# Patient Record
Sex: MALE | Race: WHITE | NOT HISPANIC OR LATINO | Employment: UNEMPLOYED | URBAN - METROPOLITAN AREA
[De-identification: names, ages, dates, MRNs, and addresses within clinical notes are randomized per-mention and may not be internally consistent; named-entity substitution may affect disease eponyms.]

---

## 2023-04-12 LAB
ANION GAP IN SER/PLAS: 9 MMOL/L (ref 10–20)
CALCIUM (MG/DL) IN SER/PLAS: 9.3 MG/DL (ref 8.6–10.6)
CARBON DIOXIDE, TOTAL (MMOL/L) IN SER/PLAS: 27 MMOL/L (ref 21–32)
CHLORIDE (MMOL/L) IN SER/PLAS: 103 MMOL/L (ref 98–107)
CREATININE (MG/DL) IN SER/PLAS: 1.97 MG/DL (ref 0.5–1.3)
ESTIMATED AVERAGE GLUCOSE FOR HBA1C: 189 MG/DL
GFR MALE: 46 ML/MIN/1.73M2
GLUCOSE (MG/DL) IN SER/PLAS: 102 MG/DL (ref 74–99)
HEMOGLOBIN A1C/HEMOGLOBIN TOTAL IN BLOOD: 8.2 %
POTASSIUM (MMOL/L) IN SER/PLAS: 4.7 MMOL/L (ref 3.5–5.3)
SODIUM (MMOL/L) IN SER/PLAS: 134 MMOL/L (ref 136–145)
UREA NITROGEN (MG/DL) IN SER/PLAS: 27 MG/DL (ref 6–23)

## 2023-08-09 LAB
CHOLESTEROL (MG/DL) IN SER/PLAS: 189 MG/DL (ref 0–199)
CHOLESTEROL IN HDL (MG/DL) IN SER/PLAS: 26.1 MG/DL
CHOLESTEROL IN LDL (MG/DL) IN SER/PLAS BY DIRECT ASSAY: 85 MG/DL (ref 0–129)
CHOLESTEROL/HDL RATIO: 7.2
LDL: ABNORMAL MG/DL (ref 0–99)
THYROTROPIN (MIU/L) IN SER/PLAS BY DETECTION LIMIT <= 0.05 MIU/L: 6.12 MIU/L (ref 0.44–3.98)
THYROXINE (T4) FREE (NG/DL) IN SER/PLAS: 0.79 NG/DL (ref 0.61–1.12)
TRIGLYCERIDE (MG/DL) IN SER/PLAS: 545 MG/DL (ref 0–149)
VLDL: ABNORMAL MG/DL (ref 0–40)

## 2023-10-23 DIAGNOSIS — E78.1 PURE HYPERGLYCERIDEMIA: ICD-10-CM

## 2023-10-23 RX ORDER — FENOFIBRATE 48 MG/1
48 TABLET, FILM COATED ORAL DAILY
Qty: 30 TABLET | Refills: 0 | Status: SHIPPED | OUTPATIENT
Start: 2023-10-23

## 2023-11-22 ENCOUNTER — OFFICE VISIT (OUTPATIENT)
Dept: OPHTHALMOLOGY | Facility: CLINIC | Age: 33
End: 2023-11-22
Payer: MEDICARE

## 2023-11-22 DIAGNOSIS — E10.3541: Primary | ICD-10-CM

## 2023-11-22 DIAGNOSIS — E10.3543: ICD-10-CM

## 2023-11-22 PROCEDURE — 99213 OFFICE O/P EST LOW 20 MIN: CPT | Performed by: OPHTHALMOLOGY

## 2023-11-22 ASSESSMENT — ENCOUNTER SYMPTOMS
ENDOCRINE NEGATIVE: 0
EYES NEGATIVE: 0
RESPIRATORY NEGATIVE: 0
CONSTITUTIONAL NEGATIVE: 0
HEMATOLOGIC/LYMPHATIC NEGATIVE: 0
ALLERGIC/IMMUNOLOGIC NEGATIVE: 0
GASTROINTESTINAL NEGATIVE: 0
NEUROLOGICAL NEGATIVE: 0
CARDIOVASCULAR NEGATIVE: 0
MUSCULOSKELETAL NEGATIVE: 0
PSYCHIATRIC NEGATIVE: 0

## 2023-11-22 ASSESSMENT — SLIT LAMP EXAM - LIDS
COMMENTS: NORMAL
COMMENTS: NORMAL

## 2023-11-22 ASSESSMENT — VISUAL ACUITY
OD_SC: NLP
OS_SC: NLP
METHOD: SNELLEN - LINEAR

## 2023-11-22 ASSESSMENT — TONOMETRY
IOP_METHOD: TONOPEN
OS_IOP_MMHG: 26
OD_IOP_MMHG: UNABLE

## 2023-11-22 ASSESSMENT — CUP TO DISC RATIO
OS_RATIO: UNABLE
OD_RATIO: NO VIEW

## 2023-11-22 ASSESSMENT — EXTERNAL EXAM - LEFT EYE: OS_EXAM: NORMAL

## 2023-11-22 ASSESSMENT — EXTERNAL EXAM - RIGHT EYE: OD_EXAM: NORMAL

## 2023-11-22 NOTE — PROGRESS NOTES
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     [facility] for visual rehab;      Plan  RTC 6 months     Continue drops as below:     Atropine BID OU     Pred BID OU     Cosopt BID OU

## 2023-12-19 ENCOUNTER — OFFICE VISIT (OUTPATIENT)
Dept: PRIMARY CARE | Facility: CLINIC | Age: 33
End: 2023-12-19
Payer: MEDICARE

## 2023-12-19 VITALS
OXYGEN SATURATION: 95 % | BODY MASS INDEX: 46.49 KG/M2 | HEIGHT: 69 IN | WEIGHT: 313.9 LBS | HEART RATE: 91 BPM | TEMPERATURE: 97.9 F | DIASTOLIC BLOOD PRESSURE: 93 MMHG | SYSTOLIC BLOOD PRESSURE: 150 MMHG

## 2023-12-19 DIAGNOSIS — E10.3413: ICD-10-CM

## 2023-12-19 DIAGNOSIS — E66.01 OBESITY, MORBID (MULTI): ICD-10-CM

## 2023-12-19 DIAGNOSIS — Z23 ENCOUNTER FOR IMMUNIZATION: Primary | ICD-10-CM

## 2023-12-19 DIAGNOSIS — E03.9 HYPOTHYROIDISM, UNSPECIFIED TYPE: ICD-10-CM

## 2023-12-19 DIAGNOSIS — I10 HYPERTENSION, UNSPECIFIED TYPE: ICD-10-CM

## 2023-12-19 PROBLEM — H21.01 HYPHEMA, RIGHT EYE: Status: RESOLVED | Noted: 2023-12-19 | Resolved: 2023-12-19

## 2023-12-19 PROBLEM — E08.311 ADVANCED DIABETIC MACULOPATHY WITH RETINOPATHY AND MACULAR EDEMA ASSOCIATED WITH DIABETES MELLITUS DUE TO UNDERLYING CONDITION (MULTI): Status: ACTIVE | Noted: 2023-12-19

## 2023-12-19 PROBLEM — H20.00 ACUTE IRITIS: Status: RESOLVED | Noted: 2023-12-19 | Resolved: 2023-12-19

## 2023-12-19 PROBLEM — E78.00 HYPERCHOLESTEREMIA: Status: ACTIVE | Noted: 2023-12-19

## 2023-12-19 PROBLEM — J30.81 ALLERGY TO CATS: Status: ACTIVE | Noted: 2023-12-19

## 2023-12-19 PROBLEM — N28.9 RENAL DYSFUNCTION: Status: ACTIVE | Noted: 2023-12-19

## 2023-12-19 PROBLEM — N50.89 TESTICLE LUMP: Status: RESOLVED | Noted: 2023-12-19 | Resolved: 2023-12-19

## 2023-12-19 PROBLEM — G56.03 BILATERAL CARPAL TUNNEL SYNDROME: Status: ACTIVE | Noted: 2023-12-19

## 2023-12-19 PROBLEM — H40.53X0 NEOVASCULAR GLAUCOMA OF BOTH EYES: Status: ACTIVE | Noted: 2023-12-19

## 2023-12-19 PROBLEM — H53.8 BLURRY VISION, RIGHT EYE: Status: ACTIVE | Noted: 2023-12-19

## 2023-12-19 PROBLEM — E55.9 VITAMIN D DEFICIENCY: Status: ACTIVE | Noted: 2023-12-19

## 2023-12-19 PROBLEM — E11.9 DIABETES (MULTI): Status: ACTIVE | Noted: 2023-11-22

## 2023-12-19 PROBLEM — H40.50X0 NEOVASCULAR GLAUCOMA: Status: ACTIVE | Noted: 2023-12-19

## 2023-12-19 PROBLEM — H01.003 BLEPHARITIS OF RIGHT EYELID: Status: RESOLVED | Noted: 2023-12-19 | Resolved: 2023-12-19

## 2023-12-19 PROBLEM — E10.3519: Status: ACTIVE | Noted: 2023-12-19

## 2023-12-19 PROBLEM — H25.099 SENILE INCIPIENT CATARACT: Status: ACTIVE | Noted: 2023-12-19

## 2023-12-19 PROBLEM — H57.11 PAIN OF RIGHT EYE: Status: RESOLVED | Noted: 2023-12-19 | Resolved: 2023-12-19

## 2023-12-19 PROCEDURE — 82570 ASSAY OF URINE CREATININE: CPT

## 2023-12-19 PROCEDURE — 3077F SYST BP >= 140 MM HG: CPT | Performed by: NURSE PRACTITIONER

## 2023-12-19 PROCEDURE — 4010F ACE/ARB THERAPY RXD/TAKEN: CPT | Performed by: NURSE PRACTITIONER

## 2023-12-19 PROCEDURE — 90686 IIV4 VACC NO PRSV 0.5 ML IM: CPT | Performed by: NURSE PRACTITIONER

## 2023-12-19 PROCEDURE — 3052F HG A1C>EQUAL 8.0%<EQUAL 9.0%: CPT | Performed by: NURSE PRACTITIONER

## 2023-12-19 PROCEDURE — 3080F DIAST BP >= 90 MM HG: CPT | Performed by: NURSE PRACTITIONER

## 2023-12-19 PROCEDURE — 1036F TOBACCO NON-USER: CPT | Performed by: NURSE PRACTITIONER

## 2023-12-19 PROCEDURE — 82043 UR ALBUMIN QUANTITATIVE: CPT

## 2023-12-19 PROCEDURE — 99214 OFFICE O/P EST MOD 30 MIN: CPT | Performed by: NURSE PRACTITIONER

## 2023-12-19 PROCEDURE — 3062F POS MACROALBUMINURIA REV: CPT | Performed by: NURSE PRACTITIONER

## 2023-12-19 PROCEDURE — G0008 ADMIN INFLUENZA VIRUS VAC: HCPCS | Performed by: NURSE PRACTITIONER

## 2023-12-19 RX ORDER — FLASH GLUCOSE SENSOR
1 KIT MISCELLANEOUS
COMMUNITY
Start: 2023-08-18 | End: 2024-01-09 | Stop reason: SDUPTHER

## 2023-12-19 RX ORDER — HUMAN INSULIN 100 [IU]/ML
INJECTION, SUSPENSION SUBCUTANEOUS
COMMUNITY
End: 2024-03-26

## 2023-12-19 RX ORDER — AMLODIPINE BESYLATE 10 MG/1
10 TABLET ORAL DAILY
COMMUNITY
End: 2024-03-05 | Stop reason: SDUPTHER

## 2023-12-19 RX ORDER — ERYTHROMYCIN 5 MG/G
1 OINTMENT OPHTHALMIC NIGHTLY
COMMUNITY
Start: 2019-09-25

## 2023-12-19 RX ORDER — ATROPINE SULFATE 10 MG/ML
1 SOLUTION/ DROPS OPHTHALMIC 2 TIMES DAILY
COMMUNITY
Start: 2019-12-30 | End: 2024-03-26 | Stop reason: SDUPTHER

## 2023-12-19 RX ORDER — DORZOLAMIDE HYDROCHLORIDE AND TIMOLOL MALEATE 20; 5 MG/ML; MG/ML
1 SOLUTION/ DROPS OPHTHALMIC 2 TIMES DAILY
COMMUNITY
End: 2024-01-08

## 2023-12-19 RX ORDER — HUMAN INSULIN 100 [IU]/ML
100 INJECTION, SOLUTION SUBCUTANEOUS DAILY
COMMUNITY

## 2023-12-19 RX ORDER — CHOLECALCIFEROL (VITAMIN D3) 125 MCG
125 CAPSULE ORAL DAILY
COMMUNITY

## 2023-12-19 RX ORDER — ATORVASTATIN CALCIUM 20 MG/1
20 TABLET, FILM COATED ORAL DAILY
COMMUNITY

## 2023-12-19 RX ORDER — PREDNISOLONE ACETATE 10 MG/ML
1 SUSPENSION/ DROPS OPHTHALMIC 2 TIMES DAILY
COMMUNITY

## 2023-12-19 RX ORDER — KRILL/OM-3/DHA/EPA/PHOSPHO/AST 1000-170MG
CAPSULE ORAL
COMMUNITY

## 2023-12-19 RX ORDER — METOPROLOL SUCCINATE 100 MG/1
100 TABLET, EXTENDED RELEASE ORAL DAILY
COMMUNITY
End: 2024-05-10 | Stop reason: SDUPTHER

## 2023-12-19 RX ORDER — CETIRIZINE HYDROCHLORIDE 10 MG/1
10 TABLET ORAL DAILY
COMMUNITY

## 2023-12-19 RX ORDER — LISINOPRIL 20 MG/1
20 TABLET ORAL DAILY
COMMUNITY
End: 2024-03-05 | Stop reason: SDUPTHER

## 2023-12-19 RX ORDER — LEVOTHYROXINE SODIUM 100 UG/1
100 TABLET ORAL DAILY
COMMUNITY
Start: 2023-10-23 | End: 2024-04-23 | Stop reason: SDUPTHER

## 2023-12-19 RX ORDER — HYDROCHLOROTHIAZIDE 12.5 MG/1
12.5 TABLET ORAL DAILY
COMMUNITY

## 2023-12-19 RX ORDER — SERTRALINE HYDROCHLORIDE 50 MG/1
50 TABLET, FILM COATED ORAL DAILY
COMMUNITY
End: 2024-03-05 | Stop reason: SDUPTHER

## 2023-12-19 ASSESSMENT — PATIENT HEALTH QUESTIONNAIRE - PHQ9
1. LITTLE INTEREST OR PLEASURE IN DOING THINGS: NOT AT ALL
SUM OF ALL RESPONSES TO PHQ9 QUESTIONS 1 AND 2: 0
2. FEELING DOWN, DEPRESSED OR HOPELESS: NOT AT ALL

## 2023-12-19 NOTE — PROGRESS NOTES
Chief Complaint  Hypertension.    History Of Present Illness  Jose Luis Britton is a 33 y.o. male presents today for follow up of Hypertension and DM   Presents today w momVitor Benavides remains on amlodipine, hydrochlorothiazide, lisinoprol and metoprolol for blood pressure management.  He does not monitor blood pressures at home.   He is compliant with medications.  Blood pressure is slightly elevated today but has not been so over the last 2 visits.  He denies headaches, chest pains, shortness of breath.     Reports that his blood sugars have been mostly stable but he does experience low blood sugars 53-69 several times per week.  Low blood sugars occur in the early afternoon.  He frequently also awakes with high blood sugars 210-240.  He is following a low-carb diet.  For diabetes management he has been taking Novolin NPH 65 units in a.m. and 70 units in p.m., Novolin R 25 units before breakfast, 25 units before lunch, 30 units before dinner plus sliding scale.           Anxiety/depression  Sertraline 50 mg daily. Moods stable.      HM   Tetanus vaccine today   - Aim for 30 minutes of aerobic exercise, 5 times per week. Try to cut back on processed foods, including foods made with flour, sugar, and added salt.   -recommended biannual dental exams and cleanings.           Review of Systems  Review of Systems    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), Glaucoma secondary to other eye disorders, right eye, indeterminate stage (06/07/2019), Glaucoma secondary to other eye disorders, unspecified eye, indeterminate stage (12/28/2018), Hyphema, right eye (12/19/2023), Hypothyroidism, unspecified (11/18/2021), Ocular pain, right eye, Other specified disorders of eye and adnexa  (02/08/2019), Other visual disturbances (12/26/2018), Pure hypercholesterolemia, unspecified (01/28/2018), Testicle lump (12/19/2023), Type 1 diabetes mellitus without complications (CMS/HCC) (11/14/2022), Unspecified acute and subacute iridocyclitis (12/27/2018), Unspecified blepharitis right eye, unspecified eyelid (08/22/2018), and Unspecified retinal disorder (01/09/2020).    Surgical History  He has a past surgical history that includes Other surgical history (03/29/2022).    Family History  Family History   Problem Relation Name Age of Onset    Diverticulosis Mother      Arthritis Mother      Endometriosis Mother      Migraines Mother      Thyroid disease Mother      Hypertension Father      Cancer Maternal Grandfather      Other (cardiac disorder) Paternal Grandfather          Social History  He reports that he has never smoked. He has never used smokeless tobacco. He reports that he does not drink alcohol and does not use drugs.    Allergies  Cat dander    Medications  Current Outpatient Medications   Medication Instructions    amLODIPine (NORVASC) 10 mg, oral, Daily, as directed    atorvastatin (LIPITOR) 20 mg, oral, Daily    atropine 1 % ophthalmic solution 1 drop, Both Eyes, 2 times daily    cetirizine (ZYRTEC) 10 mg, oral, Daily    cholecalciferol (VITAMIN D-3) 125 mcg, oral, Daily    dorzolamide-timoloL (Cosopt) 22.3-6.8 mg/mL ophthalmic solution INSTILL 1 DROP IN BOTH EYES TWICE DAILY    erythromycin (Romycin) 5 mg/gram (0.5 %) ophthalmic ointment 1 Application, Both Eyes, Nightly    fenofibrate (TRICOR) 48 mg, oral, Daily    FreeStyle Deep 2 Sensor kit 1 each, subcutaneous, Every 14 days    hydroCHLOROthiazide (HYDRODIURIL) 12.5 mg, oral, Daily    krill-om-3-dha-epa-phospho-ast (krill oil) 1,671-375-03-80 mg capsule oral    levothyroxine (SYNTHROID, LEVOXYL) 100 mcg, oral, Daily    lisinopril 20 mg, oral, Daily    metoprolol succinate XL (TOPROL-XL) 100 mg, oral, Daily    NovoLIN N NPH U-100 Insulin  "100 unit/mL injection  inject 65 UNITS IN THE MORNING and 70 UNITS IN THE EVENING at bedtime    NovoLIN R Regular U100 Insulin 100 Units, subcutaneous, Daily, 25, 25, 30    prednisoLONE acetate (Pred-Forte) 1 % ophthalmic suspension 1 drop, Both Eyes, 2 times daily    sertraline (ZOLOFT) 50 mg, oral, Daily        Objective   BP (!) 150/93   Pulse 91   Temp 36.6 °C (97.9 °F)   Ht 1.753 m (5' 9\")   Wt 142 kg (313 lb 14.4 oz)   SpO2 95%   BMI 46.35 kg/m²    BMI: Estimated body mass index is 46.35 kg/m² as calculated from the following:    Height as of this encounter: 1.753 m (5' 9\").    Weight as of this encounter: 142 kg (313 lb 14.4 oz).    Physical Exam  Physical Exam    Relevant Results and Imaging  Office Visit on 12/19/2023   Component Date Value Ref Range Status    Albumin, Urine Random 12/19/2023 1,585.7  Not established mg/L Final    Creatinine, Urine Random 12/19/2023 73.3  20.0 - 370.0 mg/dL Final    Albumin/Creatine Ratio 12/19/2023 2,163.3 (H)  <30.0 ug/mg Creat Final   Orders Only on 08/09/2023   Component Date Value Ref Range Status    TSH 08/09/2023 6.12 (H)  0.44 - 3.98 mIU/L Final    Comment:  TSH testing is performed using different testing    methodology at [facility] than at other    [facility]. Direct result comparisons should    only be made within the same method.      Cholesterol 08/09/2023 189  0 - 199 mg/dL Final    Comment: .      AGE      DESIRABLE   BORDERLINE HIGH   HIGH     0-19 Y     0 - 169       170 - 199     >/= 200    20-24 Y     0 - 189       190 - 224     >/= 225         >24 Y     0 - 199       200 - 239     >/= 240   **All ranges are based on fasting samples. Specific   therapeutic targets will vary based on patient-specific   cardiac risk.  .   Pediatric guidelines reference:Pediatrics 2011, 128(S5).   Adult guidelines reference: NCEP ATPIII Guidelines,     YAZAN 2001, 258:2486-97  .   Venipuncture immediately after or during the    administration of " Metamizole may lead to falsely   low results. Testing should be performed immediately   prior to Metamizole dosing.      HDL 08/09/2023 26.1 (A)  mg/dL Final    Comment: .      AGE      VERY LOW   LOW     NORMAL    HIGH       0-19 Y       < 35   < 40     40-45     ----    20-24 Y       ----   < 40       >45     ----      >24 Y       ----   < 40     40-60      >60  .      Cholesterol/HDL Ratio 08/09/2023 7.2 (A)   Final    Comment: REF VALUES  DESIRABLE  < 3.4  HIGH RISK  > 5.0      LDL 08/09/2023 -  0 - 99 mg/dL Final    Comment: .                           NEAR      BORD      AGE      DESIRABLE  OPTIMAL    HIGH     HIGH     VERY HIGH     0-19 Y     0 - 109     ---    110-129   >/= 130     ----    20-24 Y     0 - 119     ---    120-159   >/= 160     ----      >24 Y     0 -  99   100-129  130-159   160-189     >/=190  .  THE CALCULATION OF LDL AND VLDL ARE INACCURATE  WHEN TRIGLYCERIDES ARE GREATER THAN 400 MG/DL  OR WHEN THE PATIENT IS NON-FASTING. IF LDL  MEASUREMENT IS NECESSARY CONTACT THE TESTING  LABORATORY FOR AN ALTERNATIVE LDL ASSAY.      VLDL 08/09/2023 SEE COMMENT  0 - 40 mg/dL Final      Unable to calculate VLDL.    Triglycerides 08/09/2023 545 (H)  0 - 149 mg/dL Final    Comment: .      AGE      DESIRABLE   BORDERLINE HIGH   HIGH     VERY HIGH   0 D-90 D    19 - 174         ----         ----        ----  91 D- 9 Y     0 -  74        75 -  99     >/= 100      ----    10-19 Y     0 -  89        90 - 129     >/= 130      ----    20-24 Y     0 - 114       115 - 149     >/= 150      ----         >24 Y     0 - 149       150 - 199    200- 499    >/= 500  .   Venipuncture immediately after or during the    administration of Metamizole may lead to falsely   low results. Testing should be performed immediately   prior to Metamizole dosing.      Free T4 08/09/2023 0.79  0.61 - 1.12 ng/dL Final    Comment:  Thyroxine Free testing is performed using different testing    methodology at [facility] than at  other    [facility] hospitals. Direct result comparisons should    only be made within the same method.  .   Biotin can cause falsely elevated free T4 results. Patients   taking a Biotin dose of up to 10 mg/day should refrain from   taking Biotin for 24 hours before sample collection. Patient   taking a Biotin dose of >10 mg/day should consult with their   physician or the laboratory before the blood draw.      LDL Direct 08/09/2023 85  0 - 129 mg/dL Final    Comment: Elevated levels of LDL cholesterol are recognized as a key   factor in the development of atherosclerosis and CHD. The   direct LDL cholesterol test can be used to assess   cardiovascular risk and monitor therapy as a follow up to   a lipid profile when triglycerides are significantly elevated.     Orders Only on 04/12/2023   Component Date Value Ref Range Status    Glucose 04/12/2023 102 (H)  74 - 99 mg/dL Final    Sodium 04/12/2023 134 (L)  136 - 145 mmol/L Final    Potassium 04/12/2023 4.7  3.5 - 5.3 mmol/L Final    Chloride 04/12/2023 103  98 - 107 mmol/L Final    Bicarbonate 04/12/2023 27  21 - 32 mmol/L Final    Anion Gap 04/12/2023 9 (L)  10 - 20 mmol/L Final    Urea Nitrogen 04/12/2023 27 (H)  6 - 23 mg/dL Final    Creatinine 04/12/2023 1.97 (H)  0.50 - 1.30 mg/dL Final    GFR MALE 04/12/2023 46 (A)  >90 mL/min/1.73m2 Final    Comment:  CALCULATIONS OF ESTIMATED GFR ARE PERFORMED   USING THE 2021 CKD-EPI STUDY REFIT EQUATION   WITHOUT THE RACE VARIABLE FOR THE IDMS-TRACEABLE   CREATININE METHODS.    https://jasn.asnjournals.org/content/early/2021/09/22/ASN.1315217897      Calcium 04/12/2023 9.3  8.6 - 10.6 mg/dL Final   Orders Only on 04/12/2023   Component Date Value Ref Range Status    Hemoglobin A1C 04/12/2023 8.2 (A)  % Final    Comment:      Diagnosis of Diabetes-Adults   Non-Diabetic: < or = 5.6%   Increased risk for developing diabetes: 5.7-6.4%   Diagnostic of diabetes: > or = 6.5%  .       Monitoring of Diabetes                Age (y)      Therapeutic Goal (%)   Adults:          >18           <7.0   Pediatrics:    13-18           <7.5                   7-12           <8.0                   0- 6            7.5-8.5   American Diabetes Association. Diabetes Care 33(S1), Jan 2010.      Estimated Average Glucose 04/12/2023 189  MG/DL Final     No images are attached to the encounter.        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    PLAN:Decrease AM NPH to 60u, incraese evening NPH to 75   -cont  Novolin R 25/25/30 +ssi units with meals  -Last hemoglobin A1c 4/12/2023 8.2, creatinine 1.97 (baseline uptrending year over year)-was referred to nephrology Dr. Romo- does not see regularly. Has not followed up as of yet   -currently does not follow w podiatrist - referral ordered   -Diabetic visual loss/neovascular glaucoma both eyes/cataract  -opthalmic medication: Atropine sulfate 1%, dorzolamide/timolol, erythromycin 5 mg/g ophthalmic ointment, prednisolone ophthalmic.Last eye exam: Dr. Chang         Relevant Orders    Hemoglobin A1C    Comprehensive Metabolic Panel    Referral to Podiatry    Albumin, urine, random (Completed)    Hypertension I10     Managed on amlodipine, hydrochlorothiazide, lisinopril and metoprolol   Slightly Elevated blood pressure today, normal over the past several visits.  No change in medications today.         Hypothyroidism E03.9     TSH today         Relevant Orders    Tsh With Reflex To Free T4 If Abnormal    Obesity, morbid (CMS/[facility]) E66.01     Other Visit Diagnoses         Codes    Encounter for immunization    -  Primary Z23    Relevant Orders    Flu vaccine (IIV4) age 6 months and greater, preservative free (Completed)

## 2023-12-20 LAB
CREAT UR-MCNC: 73.3 MG/DL (ref 20–370)
MICROALBUMIN UR-MCNC: 1585.7 MG/L
MICROALBUMIN/CREAT UR: 2163.3 UG/MG CREAT

## 2023-12-20 NOTE — ASSESSMENT & PLAN NOTE
Managed on amlodipine, hydrochlorothiazide, lisinopril and metoprolol   Slightly Elevated blood pressure today, normal over the past several visits.  No change in medications today.

## 2023-12-21 PROBLEM — F32.A ANXIETY AND DEPRESSION: Status: ACTIVE | Noted: 2023-12-21

## 2023-12-21 PROBLEM — E78.2 MIXED HYPERLIPIDEMIA: Status: ACTIVE | Noted: 2023-12-19

## 2023-12-21 PROBLEM — F41.9 ANXIETY AND DEPRESSION: Status: ACTIVE | Noted: 2023-12-21

## 2023-12-21 PROBLEM — E66.01 OBESITY, MORBID (MULTI): Status: ACTIVE | Noted: 2023-12-21

## 2023-12-22 NOTE — ASSESSMENT & PLAN NOTE
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    PLAN:Decrease AM NPH to 60u, incraese evening NPH to 75   -cont  Novolin R 25/25/30 +ssi units with meals  -Last hemoglobin A1c 4/12/2023 8.2, creatinine 1.97 (baseline uptrending year over year)-was referred to nephrology Dr. Romo- does not see regularly. Has not followed up as of yet   -currently does not follow w podiatrist - referral ordered   -Diabetic visual loss/neovascular glaucoma both eyes/cataract  -opthalmic medication: Atropine sulfate 1%, dorzolamide/timolol, erythromycin 5 mg/g ophthalmic ointment, prednisolone ophthalmic.Last eye exam: Dr. Chang

## 2023-12-22 NOTE — ASSESSMENT & PLAN NOTE
Triglycerides elevated 8/9/23  -Since last visit due to elevated triglycerides have started patient on fenofibrate.  -repeat labs at next visit

## 2024-01-05 DIAGNOSIS — H40.51X4 GLAUCOMA SECONDARY TO OTHER EYE DISORDERS, RIGHT EYE, INDETERMINATE STAGE: ICD-10-CM

## 2024-01-08 RX ORDER — DORZOLAMIDE HYDROCHLORIDE AND TIMOLOL MALEATE 20; 5 MG/ML; MG/ML
SOLUTION/ DROPS OPHTHALMIC
Qty: 60 ML | Refills: 0 | Status: SHIPPED | OUTPATIENT
Start: 2024-01-08

## 2024-01-09 ENCOUNTER — TELEPHONE (OUTPATIENT)
Dept: PRIMARY CARE | Facility: CLINIC | Age: 34
End: 2024-01-09

## 2024-01-09 ENCOUNTER — APPOINTMENT (OUTPATIENT)
Dept: PODIATRY | Facility: CLINIC | Age: 34
End: 2024-01-09
Payer: MEDICARE

## 2024-01-09 DIAGNOSIS — E10.3593: Primary | ICD-10-CM

## 2024-01-09 DIAGNOSIS — E08.311 ADVANCED DIABETIC MACULOPATHY WITH RETINOPATHY AND MACULAR EDEMA ASSOCIATED WITH DIABETES MELLITUS DUE TO UNDERLYING CONDITION (MULTI): ICD-10-CM

## 2024-01-09 DIAGNOSIS — E10.3519: Primary | ICD-10-CM

## 2024-01-09 RX ORDER — FLASH GLUCOSE SENSOR
1 KIT MISCELLANEOUS
Qty: 6 EACH | Refills: 3 | Status: SHIPPED | OUTPATIENT
Start: 2024-01-09 | End: 2025-01-08

## 2024-01-09 NOTE — TELEPHONE ENCOUNTER
requested call back. Would like to review labs and recommend to repeat BMP as Creatinine is elevated. A1c also elevated (8.1)-insulin was adjusted during last visit.   Kennedy needs to follow up w nephrologist (kidney doctor)

## 2024-01-29 DIAGNOSIS — J30.81 ALLERGIC RHINITIS DUE TO ANIMAL (CAT) (DOG) HAIR AND DANDER: ICD-10-CM

## 2024-01-29 RX ORDER — MONTELUKAST SODIUM 10 MG/1
10 TABLET ORAL DAILY
Qty: 90 TABLET | Refills: 1 | Status: SHIPPED | OUTPATIENT
Start: 2024-01-29 | End: 2024-03-05 | Stop reason: SDUPTHER

## 2024-03-04 ENCOUNTER — TELEPHONE (OUTPATIENT)
Dept: PRIMARY CARE | Facility: CLINIC | Age: 34
End: 2024-03-04
Payer: MEDICARE

## 2024-03-04 NOTE — TELEPHONE ENCOUNTER
Dad left message for refill of     Amlodipine   Montelukast   Lisinopril  Sertraline    To [facility]

## 2024-03-05 DIAGNOSIS — J30.81 ALLERGIC RHINITIS DUE TO ANIMAL (CAT) (DOG) HAIR AND DANDER: ICD-10-CM

## 2024-03-05 DIAGNOSIS — J30.81 ALLERGY TO CATS: ICD-10-CM

## 2024-03-05 DIAGNOSIS — E78.2 MIXED HYPERLIPIDEMIA: Primary | ICD-10-CM

## 2024-03-05 DIAGNOSIS — F41.9 ANXIETY AND DEPRESSION: ICD-10-CM

## 2024-03-05 DIAGNOSIS — F32.A ANXIETY AND DEPRESSION: ICD-10-CM

## 2024-03-05 DIAGNOSIS — I10 HYPERTENSION, UNSPECIFIED TYPE: ICD-10-CM

## 2024-03-05 RX ORDER — SERTRALINE HYDROCHLORIDE 50 MG/1
50 TABLET, FILM COATED ORAL DAILY
Qty: 90 TABLET | Refills: 3 | Status: SHIPPED | OUTPATIENT
Start: 2024-03-05 | End: 2025-03-05

## 2024-03-05 RX ORDER — AMLODIPINE BESYLATE 10 MG/1
10 TABLET ORAL DAILY
Qty: 90 TABLET | Refills: 3 | Status: SHIPPED | OUTPATIENT
Start: 2024-03-05 | End: 2025-03-05

## 2024-03-05 RX ORDER — LISINOPRIL 20 MG/1
20 TABLET ORAL DAILY
Qty: 90 TABLET | Refills: 3 | Status: SHIPPED | OUTPATIENT
Start: 2024-03-05 | End: 2025-03-05

## 2024-03-05 RX ORDER — MONTELUKAST SODIUM 10 MG/1
10 TABLET ORAL DAILY
Qty: 90 TABLET | Refills: 1 | Status: SHIPPED | OUTPATIENT
Start: 2024-03-05

## 2024-03-12 ENCOUNTER — APPOINTMENT (OUTPATIENT)
Dept: PODIATRY | Facility: CLINIC | Age: 34
End: 2024-03-12
Payer: MEDICARE

## 2024-03-26 DIAGNOSIS — E10.9 TYPE 1 DIABETES MELLITUS WITHOUT COMPLICATIONS (MULTI): ICD-10-CM

## 2024-03-26 DIAGNOSIS — E10.3519: Primary | ICD-10-CM

## 2024-03-26 RX ORDER — ATROPINE SULFATE 10 MG/ML
1 SOLUTION/ DROPS OPHTHALMIC 2 TIMES DAILY
Qty: 10 ML | Refills: 11 | Status: SHIPPED | OUTPATIENT
Start: 2024-03-26 | End: 2024-04-22 | Stop reason: SDUPTHER

## 2024-03-26 RX ORDER — HUMAN INSULIN 100 [IU]/ML
INJECTION, SUSPENSION SUBCUTANEOUS
Qty: 120 ML | Refills: 0 | Status: SHIPPED | OUTPATIENT
Start: 2024-03-26

## 2024-03-26 NOTE — TELEPHONE ENCOUNTER
Medication refill received from [facility]    Atropine 1% Eye Drops refill sent Erx with 11 refills    No further action is required at this time    Marcos Joseph

## 2024-04-15 ENCOUNTER — APPOINTMENT (OUTPATIENT)
Dept: PRIMARY CARE | Facility: CLINIC | Age: 34
End: 2024-04-15
Payer: MEDICARE

## 2024-04-19 ENCOUNTER — APPOINTMENT (OUTPATIENT)
Dept: PRIMARY CARE | Facility: CLINIC | Age: 34
End: 2024-04-19
Payer: MEDICARE

## 2024-04-22 ENCOUNTER — OFFICE VISIT (OUTPATIENT)
Dept: PRIMARY CARE | Facility: CLINIC | Age: 34
End: 2024-04-22
Payer: MEDICARE

## 2024-04-22 VITALS
SYSTOLIC BLOOD PRESSURE: 138 MMHG | WEIGHT: 304.6 LBS | BODY MASS INDEX: 42.64 KG/M2 | TEMPERATURE: 97.4 F | DIASTOLIC BLOOD PRESSURE: 99 MMHG | OXYGEN SATURATION: 98 % | HEART RATE: 84 BPM | HEIGHT: 71 IN

## 2024-04-22 DIAGNOSIS — E66.01 OBESITY, MORBID (MULTI): ICD-10-CM

## 2024-04-22 DIAGNOSIS — E10.3519: ICD-10-CM

## 2024-04-22 DIAGNOSIS — E08.311 ADVANCED DIABETIC MACULOPATHY WITH RETINOPATHY AND MACULAR EDEMA ASSOCIATED WITH DIABETES MELLITUS DUE TO UNDERLYING CONDITION (MULTI): Primary | ICD-10-CM

## 2024-04-22 DIAGNOSIS — E10.3413: ICD-10-CM

## 2024-04-22 LAB — HEMOGLOBIN A1C/HEMOGLOBIN TOTAL IN BLOOD EXTERNAL: 7.3 %

## 2024-04-22 PROCEDURE — 99214 OFFICE O/P EST MOD 30 MIN: CPT | Performed by: NURSE PRACTITIONER

## 2024-04-22 PROCEDURE — 3075F SYST BP GE 130 - 139MM HG: CPT | Performed by: NURSE PRACTITIONER

## 2024-04-22 PROCEDURE — 4010F ACE/ARB THERAPY RXD/TAKEN: CPT | Performed by: NURSE PRACTITIONER

## 2024-04-22 PROCEDURE — 3080F DIAST BP >= 90 MM HG: CPT | Performed by: NURSE PRACTITIONER

## 2024-04-22 RX ORDER — ATROPINE SULFATE 10 MG/ML
1 SOLUTION/ DROPS OPHTHALMIC 2 TIMES DAILY
Qty: 10 ML | Refills: 11 | Status: SHIPPED | OUTPATIENT
Start: 2024-04-22

## 2024-04-22 ASSESSMENT — ENCOUNTER SYMPTOMS
FATIGUE: 0
SWEATS: 0
CONFUSION: 0
BLACKOUTS: 0
HUNGER: 0
SPEECH DIFFICULTY: 0
WEIGHT LOSS: 0
POLYDIPSIA: 0
NERVOUS/ANXIOUS: 0
BLURRED VISION: 1
VISUAL CHANGE: 0
DIZZINESS: 0
TREMORS: 0
HEADACHES: 0
SEIZURES: 0
POLYPHAGIA: 0
WEAKNESS: 0

## 2024-04-22 ASSESSMENT — PATIENT HEALTH QUESTIONNAIRE - PHQ9
SUM OF ALL RESPONSES TO PHQ9 QUESTIONS 1 AND 2: 0
2. FEELING DOWN, DEPRESSED OR HOPELESS: NOT AT ALL
1. LITTLE INTEREST OR PLEASURE IN DOING THINGS: NOT AT ALL

## 2024-04-22 NOTE — PROGRESS NOTES
Chief Complaint  Follow-up.    History Of Present Illness  Jose Luis Britton is a 33 y.o. male presents today for Follow-up.  Diabetes  He has type 1 diabetes mellitus. MedicAlert identification noted. The initial diagnosis of diabetes was made 27 years ago. Pertinent negatives for hypoglycemia include no confusion, dizziness, headaches, hunger, mood changes, nervousness/anxiousness, pallor, seizures, sleepiness, speech difficulty, sweats or tremors. Associated symptoms include blurred vision. Pertinent negatives for diabetes include no chest pain, no fatigue, no foot paresthesias, no foot ulcerations, no polydipsia, no polyphagia, no polyuria, no visual change, no weakness and no weight loss. Hypoglycemia complications include nocturnal hypoglycemia and required assistance. Pertinent negatives for hypoglycemia complications include no blackouts, no hospitalization and no required glucagon injection. Symptoms are stable. Diabetic complications include heart disease, nephropathy and retinopathy. Pertinent negatives for diabetic complications include no CVA, impotence, peripheral neuropathy or PVD. Risk factors for coronary artery disease include dyslipidemia, family history, hypertension, obesity and sedentary lifestyle. Current diabetic treatment includes insulin injections and oral agent (triple therapy). He is compliant with treatment all of the time. He is currently taking insulin pre-breakfast, pre-lunch, pre-dinner and at bedtime. Insulin injections are given by grandparent and parent. Rotation sites for injection include the abdominal wall, arms and thighs. His weight is fluctuating minimally. He is following a diabetic, generally unhealthy, high fat/cholesterol and high salt diet. Meal planning includes carbohydrate counting. He has not had a previous visit with a dietitian. He rarely participates in exercise. He monitors blood glucose at home 5+ x per day. He monitors urine at home <1 x per month. Blood  glucose monitoring compliance is excellent. His home blood glucose trend is fluctuating minimally. His breakfast blood glucose is taken between 9-10 am. His breakfast blood glucose range is generally 110-130 mg/dl. His lunch blood glucose is taken between 12-1 pm. His lunch blood glucose range is generally 110-130 mg/dl. His dinner blood glucose is taken between 5-6 pm. His dinner blood glucose range is generally 110-130 mg/dl. His overall blood glucose range is 140-180 mg/dl. He does not see a podiatrist.Eye exam is current.     Does experience low blood sugars 1-2 times per week.  Has not had to use glucagon.  Has been using a snack which helps.       Denies falls and injuries     Review of Systems  Review of Systems   Constitutional:  Negative for activity change, appetite change, chills, fatigue, fever and weight loss.   Eyes:  Positive for blurred vision.   Respiratory:  Negative for chest tightness and shortness of breath.    Cardiovascular:  Negative for chest pain.   Endocrine: Negative for polydipsia, polyphagia and polyuria.   Genitourinary:  Negative for impotence.   Skin:  Negative for pallor.   Neurological:  Negative for dizziness, tremors, seizures, speech difficulty, weakness and headaches.   Psychiatric/Behavioral:  Negative for confusion. The patient is not nervous/anxious.        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), Glaucoma secondary to other eye disorders, right eye, indeterminate stage (06/07/2019), Glaucoma secondary to other eye disorders, unspecified eye, indeterminate stage (12/28/2018), Hyphema, right eye (12/19/2023), Hypothyroidism, unspecified (11/18/2021), Ocular pain, right eye, Other specified disorders of eye and adnexa (02/08/2019), Other visual  disturbances (12/26/2018), Pure hypercholesterolemia, unspecified (01/28/2018), Testicle lump (12/19/2023), Type 1 diabetes mellitus without complications (Multi) (11/14/2022), Unspecified acute and subacute iridocyclitis (12/27/2018), Unspecified blepharitis right eye, unspecified eyelid (08/22/2018), and Unspecified retinal disorder (01/09/2020).    Surgical History  He has a past surgical history that includes Other surgical history (03/29/2022).    Family History  Family History   Problem Relation Name Age of Onset    Diverticulosis Mother      Arthritis Mother      Endometriosis Mother      Migraines Mother      Thyroid disease Mother      Hypertension Father      Cancer Maternal Grandfather      Other (cardiac disorder) Paternal Grandfather          Social History  He reports that he has never smoked. He has never used smokeless tobacco. He reports that he does not drink alcohol and does not use drugs.    DEPRESSION SCREEN  Over the past 2 weeks, how often have you been bothered by any of the following problems?  Little interest or pleasure in doing things: Not at all  Feeling down, depressed, or hopeless: Not at all      Allergies  Cat dander    Medications  Current Outpatient Medications   Medication Instructions    amLODIPine (NORVASC) 10 mg, oral, Daily, as directed    atorvastatin (LIPITOR) 20 mg, oral, Daily    atropine 1 % ophthalmic solution 1 drop, Both Eyes, 2 times daily    cetirizine (ZYRTEC) 10 mg, oral, Daily    cholecalciferol (VITAMIN D-3) 125 mcg, oral, Daily    dorzolamide-timoloL (Cosopt) 22.3-6.8 mg/mL ophthalmic solution INSTILL 1 DROP IN BOTH EYES TWICE DAILY    erythromycin (Romycin) 5 mg/gram (0.5 %) ophthalmic ointment 1 Application, Both Eyes, Nightly    fenofibrate (TRICOR) 48 mg, oral, Daily    FreeStyle Deep 2 Sensor kit 1 each, subcutaneous, Every 14 days    hydroCHLOROthiazide (MICROZIDE) 12.5 mg, oral, Daily    insulin NPH, Isophane, (NovoLIN N NPH U-100 Insulin) 100 unit/mL  "injection inject 65 UNITS IN THE MORNING and 70 UNITS IN THE EVENING at bedtime    krill-om-3-dha-epa-phospho-ast (krill oil) 1,559-835-59-80 mg capsule oral    levothyroxine (SYNTHROID, LEVOXYL) 112 mcg, oral, Daily    lisinopril 20 mg, oral, Daily    metoprolol succinate XL (TOPROL-XL) 100 mg, oral, Daily    montelukast (SINGULAIR) 10 mg, oral, Daily    NovoLIN R Regular U100 Insulin 100 Units, subcutaneous, Daily, 25, 25, 30    prednisoLONE acetate (Pred-Forte) 1 % ophthalmic suspension 1 drop, Both Eyes, 2 times daily    sertraline (ZOLOFT) 50 mg, oral, Daily        Objective   BP (!) 138/99 (BP Location: Left arm, Patient Position: Sitting, BP Cuff Size: Adult)   Pulse 84   Temp 36.3 °C (97.4 °F) (Temporal)   Ht 1.803 m (5' 11\")   Wt 138 kg (304 lb 9.6 oz)   SpO2 98%   BMI 42.48 kg/m²    BMI: Estimated body mass index is 42.48 kg/m² as calculated from the following:    Height as of this encounter: 1.803 m (5' 11\").    Weight as of this encounter: 138 kg (304 lb 9.6 oz).     Physical Exam  Physical Exam  Vitals and nursing note reviewed.   Constitutional:       Appearance: Normal appearance. He is obese.   HENT:      Head: Normocephalic and atraumatic.      Nose: Nose normal.      Mouth/Throat:      Mouth: Mucous membranes are moist.      Pharynx: Oropharynx is clear.   Eyes:      General: Lids are normal.      Comments: Blind    Cardiovascular:      Rate and Rhythm: Normal rate and regular rhythm.      Pulses: Normal pulses.      Heart sounds: Normal heart sounds.   Pulmonary:      Effort: Pulmonary effort is normal.      Breath sounds: Normal breath sounds.   Abdominal:      General: Bowel sounds are normal.      Palpations: Abdomen is soft.      Tenderness: There is no abdominal tenderness.   Musculoskeletal:         General: Normal range of motion.      Cervical back: Neck supple.   Skin:     General: Skin is warm and dry.   Neurological:      General: No focal deficit present.      Mental Status: He " is alert and oriented to person, place, and time. Mental status is at baseline.   Psychiatric:         Mood and Affect: Mood normal.         Behavior: Behavior normal.         Thought Content: Thought content normal.         Judgment: Judgment normal.         Relevant Results and Imaging  Office Visit on 12/19/2023   Component Date Value Ref Range Status    Albumin, Urine Random 12/19/2023 1,585.7  Not established mg/L Final    Creatinine, Urine Random 12/19/2023 73.3  20.0 - 370.0 mg/dL Final    Albumin/Creatine Ratio 12/19/2023 2,163.3 (H)  <30.0 ug/mg Creat Final   Orders Only on 08/09/2023   Component Date Value Ref Range Status    TSH 08/09/2023 6.12 (H)  0.44 - 3.98 mIU/L Final    Comment:  TSH testing is performed using different testing    methodology at [facility] than at other    [facility]. Direct result comparisons should    only be made within the same method.      Cholesterol 08/09/2023 189  0 - 199 mg/dL Final    Comment: .      AGE      DESIRABLE   BORDERLINE HIGH   HIGH     0-19 Y     0 - 169       170 - 199     >/= 200    20-24 Y     0 - 189       190 - 224     >/= 225         >24 Y     0 - 199       200 - 239     >/= 240   **All ranges are based on fasting samples. Specific   therapeutic targets will vary based on patient-specific   cardiac risk.  .   Pediatric guidelines reference:Pediatrics 2011, 128(S5).   Adult guidelines reference: NCEP ATPIII Guidelines,     YAZAN 2001, 258:2486-97  .   Venipuncture immediately after or during the    administration of Metamizole may lead to falsely   low results. Testing should be performed immediately   prior to Metamizole dosing.      HDL 08/09/2023 26.1 (A)  mg/dL Final    Comment: .      AGE      VERY LOW   LOW     NORMAL    HIGH       0-19 Y       < 35   < 40     40-45     ----    20-24 Y       ----   < 40       >45     ----      >24 Y       ----   < 40     40-60      >60  .      Cholesterol/HDL Ratio 08/09/2023 7.2 (A)   Final     Comment: REF VALUES  DESIRABLE  < 3.4  HIGH RISK  > 5.0      LDL 08/09/2023 -  0 - 99 mg/dL Final    Comment: .                           NEAR      BORD      AGE      DESIRABLE  OPTIMAL    HIGH     HIGH     VERY HIGH     0-19 Y     0 - 109     ---    110-129   >/= 130     ----    20-24 Y     0 - 119     ---    120-159   >/= 160     ----      >24 Y     0 -  99   100-129  130-159   160-189     >/=190  .  THE CALCULATION OF LDL AND VLDL ARE INACCURATE  WHEN TRIGLYCERIDES ARE GREATER THAN 400 MG/DL  OR WHEN THE PATIENT IS NON-FASTING. IF LDL  MEASUREMENT IS NECESSARY CONTACT THE TESTING  LABORATORY FOR AN ALTERNATIVE LDL ASSAY.      VLDL 08/09/2023 SEE COMMENT  0 - 40 mg/dL Final      Unable to calculate VLDL.    Triglycerides 08/09/2023 545 (H)  0 - 149 mg/dL Final    Comment: .      AGE      DESIRABLE   BORDERLINE HIGH   HIGH     VERY HIGH   0 D-90 D    19 - 174         ----         ----        ----  91 D- 9 Y     0 -  74        75 -  99     >/= 100      ----    10-19 Y     0 -  89        90 - 129     >/= 130      ----    20-24 Y     0 - 114       115 - 149     >/= 150      ----         >24 Y     0 - 149       150 - 199    200- 499    >/= 500  .   Venipuncture immediately after or during the    administration of Metamizole may lead to falsely   low results. Testing should be performed immediately   prior to Metamizole dosing.      Free T4 08/09/2023 0.79  0.61 - 1.12 ng/dL Final    Comment:  Thyroxine Free testing is performed using different testing    methodology at [facility] [place] than at other    [facility]. Direct result comparisons should    only be made within the same method.  .   Biotin can cause falsely elevated free T4 results. Patients   taking a Biotin dose of up to 10 mg/day should refrain from   taking Biotin for 24 hours before sample collection. Patient   taking a Biotin dose of >10 mg/day should consult with their   physician or the laboratory before the blood draw.      LDL Direct  08/09/2023 85  0 - 129 mg/dL Final    Comment: Elevated levels of LDL cholesterol are recognized as a key   factor in the development of atherosclerosis and CHD. The   direct LDL cholesterol test can be used to assess   cardiovascular risk and monitor therapy as a follow up to   a lipid profile when triglycerides are significantly elevated.       No images are attached to the encounter.        Assessment and Plan  Assessment/Plan   Problem List Items Addressed This Visit             ICD-10-CM    Diabetes (Multi) E11.9     Type 1 diabetes-with ophthalmic complications and renal dysfunction  AM -200, hypoglycemia  1-2 x/week frequent w  BS low- 53-69.    -cont AM NPH 60u, cont evening NPH  75   -cont  Novolin R 25/25/30 +ssi units with meals  -Last hemoglobin A1c12/21/2023 8.2, creatinine increased - referred to nephrology Dr. Romo- does not see regularly. Has not followed up as of yet   -currentlt does not follow w podiatrist - referral ordered   -Diabetic visual loss/neovascular glaucoma both eyes/cataract  -opthalmic medication: Atropine sulfate 1%, dorzolamide/timolol, erythromycin 5 mg/g ophthalmic ointment, prednisolone ophthalmic.Last eye exam: Dr. 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

## 2024-04-23 DIAGNOSIS — E03.9 HYPOTHYROIDISM, UNSPECIFIED TYPE: Primary | ICD-10-CM

## 2024-04-23 DIAGNOSIS — E10.22 TYPE 1 DIABETES MELLITUS WITH STAGE 3B CHRONIC KIDNEY DISEASE (MULTI): ICD-10-CM

## 2024-04-23 DIAGNOSIS — N18.32 TYPE 1 DIABETES MELLITUS WITH STAGE 3B CHRONIC KIDNEY DISEASE (MULTI): ICD-10-CM

## 2024-04-23 RX ORDER — LEVOTHYROXINE SODIUM 112 UG/1
112 TABLET ORAL DAILY
Qty: 90 TABLET | Refills: 0 | Status: SHIPPED | OUTPATIENT
Start: 2024-04-23 | End: 2024-07-22

## 2024-04-27 ASSESSMENT — ENCOUNTER SYMPTOMS
DIZZINESS: 0
ACTIVITY CHANGE: 0
BLACKOUTS: 0
CHEST TIGHTNESS: 0
TREMORS: 0
NERVOUS/ANXIOUS: 0
POLYPHAGIA: 0
SPEECH DIFFICULTY: 0
HUNGER: 0
SWEATS: 0
FATIGUE: 0
HEADACHES: 0
SEIZURES: 0
APPETITE CHANGE: 0
FEVER: 0
WEAKNESS: 0
CHILLS: 0
WEIGHT LOSS: 0
POLYDIPSIA: 0
CONFUSION: 0
SHORTNESS OF BREATH: 0
BLURRED VISION: 1
VISUAL CHANGE: 0

## 2024-04-28 NOTE — ASSESSMENT & PLAN NOTE
Type 1 diabetes-with ophthalmic complications and renal dysfunction  AM -200, hypoglycemia  1-2 x/week frequent w  BS low- 53-69.    -cont AM NPH 60u, cont evening NPH  75   -cont  Novolin R 25/25/30 +ssi units with meals  -Last hemoglobin A1c12/21/2023 8.2, creatinine increased - referred to nephrology Dr. Romo- does not see regularly. Has not followed up as of yet   -currentlt does not follow w podiatrist - referral ordered   -Diabetic visual loss/neovascular glaucoma both eyes/cataract  -opthalmic medication: Atropine sulfate 1%, dorzolamide/timolol, erythromycin 5 mg/g ophthalmic ointment, prednisolone ophthalmic.Last eye exam: Dr. Chang-

## 2024-04-30 ENCOUNTER — TELEPHONE (OUTPATIENT)
Dept: PRIMARY CARE | Facility: CLINIC | Age: 34
End: 2024-04-30
Payer: MEDICARE

## 2024-04-30 DIAGNOSIS — E03.9 HYPOTHYROIDISM, UNSPECIFIED TYPE: Primary | ICD-10-CM

## 2024-04-30 NOTE — TELEPHONE ENCOUNTER
Reviewed lab recommendations w patients mom who states she will pass on message to Jose Luis and his father.

## 2024-05-01 ENCOUNTER — TELEPHONE (OUTPATIENT)
Dept: RHEUMATOLOGY | Facility: CLINIC | Age: 34
End: 2024-05-01
Payer: MEDICARE

## 2024-05-09 ENCOUNTER — TELEPHONE (OUTPATIENT)
Dept: RHEUMATOLOGY | Facility: CLINIC | Age: 34
End: 2024-05-09
Payer: MEDICARE

## 2024-05-10 DIAGNOSIS — I10 HYPERTENSION, UNSPECIFIED TYPE: Primary | ICD-10-CM

## 2024-05-10 RX ORDER — METOPROLOL SUCCINATE 100 MG/1
100 TABLET, EXTENDED RELEASE ORAL DAILY
Qty: 90 TABLET | Refills: 3 | Status: SHIPPED | OUTPATIENT
Start: 2024-05-10 | End: 2025-05-10

## 2024-05-15 ENCOUNTER — LAB (OUTPATIENT)
Dept: LAB | Facility: LAB | Age: 34
End: 2024-05-15
Payer: MEDICARE

## 2024-05-15 ENCOUNTER — OFFICE VISIT (OUTPATIENT)
Dept: PRIMARY CARE | Facility: CLINIC | Age: 34
End: 2024-05-15
Payer: MEDICARE

## 2024-05-15 VITALS
BODY MASS INDEX: 44.27 KG/M2 | HEIGHT: 70 IN | HEART RATE: 91 BPM | SYSTOLIC BLOOD PRESSURE: 161 MMHG | TEMPERATURE: 98.1 F | DIASTOLIC BLOOD PRESSURE: 103 MMHG | WEIGHT: 309.2 LBS | OXYGEN SATURATION: 95 %

## 2024-05-15 DIAGNOSIS — N28.9 RENAL DYSFUNCTION: ICD-10-CM

## 2024-05-15 DIAGNOSIS — E10.3499: ICD-10-CM

## 2024-05-15 DIAGNOSIS — I10 HYPERTENSION, UNSPECIFIED TYPE: Primary | ICD-10-CM

## 2024-05-15 DIAGNOSIS — E08.311 ADVANCED DIABETIC MACULOPATHY WITH RETINOPATHY AND MACULAR EDEMA ASSOCIATED WITH DIABETES MELLITUS DUE TO UNDERLYING CONDITION (MULTI): ICD-10-CM

## 2024-05-15 LAB
ANION GAP SERPL CALC-SCNC: 13 MMOL/L (ref 10–20)
BUN SERPL-MCNC: 26 MG/DL (ref 6–23)
CALCIUM SERPL-MCNC: 8.6 MG/DL (ref 8.6–10.6)
CHLORIDE SERPL-SCNC: 102 MMOL/L (ref 98–107)
CO2 SERPL-SCNC: 23 MMOL/L (ref 21–32)
CREAT SERPL-MCNC: 2.54 MG/DL (ref 0.5–1.3)
EGFRCR SERPLBLD CKD-EPI 2021: 34 ML/MIN/1.73M*2
GLUCOSE SERPL-MCNC: 215 MG/DL (ref 74–99)
POTASSIUM SERPL-SCNC: 4.4 MMOL/L (ref 3.5–5.3)
SODIUM SERPL-SCNC: 134 MMOL/L (ref 136–145)

## 2024-05-15 PROCEDURE — 3080F DIAST BP >= 90 MM HG: CPT | Performed by: NURSE PRACTITIONER

## 2024-05-15 PROCEDURE — 4010F ACE/ARB THERAPY RXD/TAKEN: CPT | Performed by: NURSE PRACTITIONER

## 2024-05-15 PROCEDURE — 1036F TOBACCO NON-USER: CPT | Performed by: NURSE PRACTITIONER

## 2024-05-15 PROCEDURE — 80048 BASIC METABOLIC PNL TOTAL CA: CPT

## 2024-05-15 PROCEDURE — 99214 OFFICE O/P EST MOD 30 MIN: CPT | Performed by: NURSE PRACTITIONER

## 2024-05-15 PROCEDURE — 3077F SYST BP >= 140 MM HG: CPT | Performed by: NURSE PRACTITIONER

## 2024-05-15 PROCEDURE — 36415 COLL VENOUS BLD VENIPUNCTURE: CPT

## 2024-05-15 RX ORDER — HYDRALAZINE HYDROCHLORIDE 25 MG/1
25 TABLET, FILM COATED ORAL 2 TIMES DAILY
Qty: 180 TABLET | Refills: 1 | Status: SHIPPED | OUTPATIENT
Start: 2024-05-15 | End: 2024-11-11

## 2024-05-15 RX ORDER — SYRINGE-NEEDLE,INSULIN,0.5 ML 27GX1/2"
SYRINGE, EMPTY DISPOSABLE MISCELLANEOUS
Qty: 400 EACH | Refills: 3 | Status: SHIPPED | OUTPATIENT
Start: 2024-05-15 | End: 2025-05-15

## 2024-05-15 ASSESSMENT — ENCOUNTER SYMPTOMS
CHILLS: 0
POLYDIPSIA: 0
TREMORS: 0
ACTIVITY CHANGE: 0
CHEST TIGHTNESS: 0
FEVER: 0
NERVOUS/ANXIOUS: 0
SEIZURES: 0
SPEECH DIFFICULTY: 0
CONFUSION: 0
WEAKNESS: 0
POLYPHAGIA: 0
SHORTNESS OF BREATH: 0
HEADACHES: 1
DIZZINESS: 0
APPETITE CHANGE: 0
FATIGUE: 0

## 2024-05-15 ASSESSMENT — PATIENT HEALTH QUESTIONNAIRE - PHQ9
1. LITTLE INTEREST OR PLEASURE IN DOING THINGS: NOT AT ALL
2. FEELING DOWN, DEPRESSED OR HOPELESS: NOT AT ALL
SUM OF ALL RESPONSES TO PHQ9 QUESTIONS 1 AND 2: 0

## 2024-05-15 NOTE — ASSESSMENT & PLAN NOTE
Managed on amlodipine, hydrochlorothiazide, and metoprolol   -on lisinopril until 4/30/24- Dc'd due to DEEPAK and hyperkalemia.    -5/15/24-repeat basic metabolic panel  -Start hydralazine 25 mg p.o. twice daily for management of blood pressure, patient to monitor blood pressures at home and bring in record at follow-up in 2 weeks

## 2024-05-15 NOTE — PROGRESS NOTES
"Chief Complaint  Follow-up (C/o some headaches, and \"feeling\" his HR in extremities/).    History Of Present Illness  Jose Luis Britton is a 33 y.o. male presents today for follow up of Follow-up (C/o some headaches, and \"feeling\" his HR in extremities/).  Due to elevated creatinine and potassium level patient's lisinopril has been discontinued and he presents today for follow-up of hypertension.  He was instructed to make follow-up appointment with nephrologist-has not set up yet as the offered appointments were too far away from patient's home.  Will continue to look for nephrologist.    Blood pressure is elevated today-likely related to discontinuation of lisinopril.  Reports occasional headaches, increase sensation of pulses.    Requesting refill on diabetic supply: Insulin syringes.  Administers 4 to 5 injections/day  Review of Systems  Review of Systems   Constitutional:  Negative for activity change, appetite change, chills, fatigue and fever.   Respiratory:  Negative for chest tightness and shortness of breath.    Cardiovascular:  Negative for chest pain.   Endocrine: Negative for polydipsia, polyphagia and polyuria.   Skin:  Negative for pallor.   Neurological:  Positive for headaches. Negative for dizziness, tremors, seizures, speech difficulty and weakness.   Psychiatric/Behavioral:  Negative for confusion. The patient is not nervous/anxious.        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), Glaucoma secondary to other eye disorders, right eye, indeterminate stage (06/07/2019), Glaucoma secondary to other eye disorders, unspecified eye, indeterminate stage (12/28/2018), Hyphema, right eye (12/19/2023), Hypothyroidism, unspecified (11/18/2021), Ocular pain, right eye, Other " specified disorders of eye and adnexa (02/08/2019), Other visual disturbances (12/26/2018), Pure hypercholesterolemia, unspecified (01/28/2018), Testicle lump (12/19/2023), Type 1 diabetes mellitus without complications (Multi) (11/14/2022), Unspecified acute and subacute iridocyclitis (12/27/2018), Unspecified blepharitis right eye, unspecified eyelid (08/22/2018), and Unspecified retinal disorder (01/09/2020).    Surgical History  He has a past surgical history that includes Other surgical history (03/29/2022).    Family History  Family History   Problem Relation Name Age of Onset    Diverticulosis Mother      Arthritis Mother      Endometriosis Mother      Migraines Mother      Thyroid disease Mother      Hypertension Father      Cancer Maternal Grandfather      Other (cardiac disorder) Paternal Grandfather          Social History  He reports that he has never smoked. He has never used smokeless tobacco. He reports that he does not drink alcohol and does not use drugs.    DEPRESSION SCREEN  Over the past 2 weeks, how often have you been bothered by any of the following problems?  Little interest or pleasure in doing things: Not at all  Feeling down, depressed, or hopeless: Not at all      Allergies  Cat dander    Medications  Current Outpatient Medications   Medication Instructions    amLODIPine (NORVASC) 10 mg, oral, Daily, as directed    atorvastatin (LIPITOR) 20 mg, oral, Daily    atropine 1 % ophthalmic solution 1 drop, Both Eyes, 2 times daily    cetirizine (ZYRTEC) 10 mg, oral, Daily    cholecalciferol (VITAMIN D-3) 125 mcg, oral, Daily    dorzolamide-timoloL (Cosopt) 22.3-6.8 mg/mL ophthalmic solution INSTILL 1 DROP IN BOTH EYES TWICE DAILY    erythromycin (Romycin) 5 mg/gram (0.5 %) ophthalmic ointment 1 Application, Both Eyes, Nightly    fenofibrate (TRICOR) 48 mg, oral, Daily    FreeStyle Deep 2 Sensor kit 1 each, subcutaneous, Every 14 days    hydrALAZINE (APRESOLINE) 25 mg, oral, 2 times daily     "hydroCHLOROthiazide (MICROZIDE) 12.5 mg, oral, Daily    insulin NPH, Isophane, (NovoLIN N NPH U-100 Insulin) 100 unit/mL injection inject 65 UNITS IN THE MORNING and 70 UNITS IN THE EVENING at bedtime    insulin syringe-needle U-100 1 mL 30 gauge x 1/2\" syringe Use to inject 4 times daily as directed.    krill-om-3-dha-epa-phospho-ast (krill oil) 1,089-058-73-80 mg capsule oral    levothyroxine (SYNTHROID, LEVOXYL) 112 mcg, oral, Daily    lisinopril 20 mg, oral, Daily    metoprolol succinate XL (TOPROL-XL) 100 mg, oral, Daily    montelukast (SINGULAIR) 10 mg, oral, Daily    NovoLIN R Regular U100 Insulin 100 Units, subcutaneous, Daily, 25, 25, 30    prednisoLONE acetate (Pred-Forte) 1 % ophthalmic suspension 1 drop, Both Eyes, 2 times daily    sertraline (ZOLOFT) 50 mg, oral, Daily        Objective   BP (!) 161/103 (BP Location: Left arm, Patient Position: Sitting, BP Cuff Size: Adult)   Pulse 91   Temp 36.7 °C (98.1 °F) (Temporal)   Ht 1.778 m (5' 10\")   Wt 140 kg (309 lb 3.2 oz)   SpO2 95%   BMI 44.37 kg/m²    BMI: Estimated body mass index is 44.37 kg/m² as calculated from the following:    Height as of this encounter: 1.778 m (5' 10\").    Weight as of this encounter: 140 kg (309 lb 3.2 oz).    Physical Exam  Physical Exam  Vitals and nursing note reviewed.   Constitutional:       Appearance: Normal appearance. He is obese.   HENT:      Head: Normocephalic and atraumatic.      Nose: Nose normal.      Mouth/Throat:      Mouth: Mucous membranes are moist.      Pharynx: Oropharynx is clear.   Eyes:      General: Lids are normal.      Comments: Blind    Cardiovascular:      Rate and Rhythm: Normal rate and regular rhythm.      Heart sounds: Normal heart sounds.   Pulmonary:      Effort: Pulmonary effort is normal.      Breath sounds: Normal breath sounds.   Abdominal:      Palpations: Abdomen is soft.   Musculoskeletal:         General: Normal range of motion.      Cervical back: Neck supple.   Skin:     " "General: Skin is warm and dry.   Neurological:      General: No focal deficit present.      Mental Status: He is alert and oriented to person, place, and time. Mental status is at baseline.   Psychiatric:         Mood and Affect: Mood normal.         Behavior: Behavior normal.         Thought Content: Thought content normal.         Judgment: Judgment normal.         Relevant Results and Imaging    No images are attached to the encounter.        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    -5/15/24-repeat basic metabolic panel  -Start hydralazine 25 mg p.o. twice daily for management of blood pressure, patient to monitor blood pressures at home and bring in record at follow-up in 2 weeks            Relevant Medications    hydrALAZINE (Apresoline) 25 mg tablet    Renal dysfunction N28.9     Chronic, creatinine elevated patient with diabetes  Referred to nephrology  Creatinine baseline 1.5 to 2.7--steadily uptrending  Urine albumin very elevated  -5/15/2024 BMP repeated after discontinuation of lisinopril.  Strongly advised to set up appointment with nephrologist.         Relevant Orders    Basic metabolic panel     ADDENDUM:   BS monitoring w CGM.  Cont on insulin 4-5x/day (see med list)   Patient is adherent plan  to and benefiting from CGM equipment.      "

## 2024-05-15 NOTE — ASSESSMENT & PLAN NOTE
Chronic, creatinine elevated patient with diabetes  Referred to nephrology  Creatinine baseline 1.5 to 2.7--steadily uptrending  Urine albumin very elevated  -5/15/2024 BMP repeated after discontinuation of lisinopril.  Strongly advised to set up appointment with nephrologist.

## 2024-05-22 ENCOUNTER — APPOINTMENT (OUTPATIENT)
Dept: OPHTHALMOLOGY | Facility: CLINIC | Age: 34
End: 2024-05-22
Payer: MEDICARE

## 2024-06-05 ENCOUNTER — APPOINTMENT (OUTPATIENT)
Dept: PRIMARY CARE | Facility: CLINIC | Age: 34
End: 2024-06-05
Payer: MEDICARE

## 2024-06-13 DIAGNOSIS — J30.81 ALLERGIC RHINITIS DUE TO ANIMAL (CAT) (DOG) HAIR AND DANDER: ICD-10-CM

## 2024-06-13 DIAGNOSIS — J30.81 ALLERGY TO CATS: ICD-10-CM

## 2024-06-13 DIAGNOSIS — I10 ESSENTIAL (PRIMARY) HYPERTENSION: ICD-10-CM

## 2024-06-14 ENCOUNTER — TELEPHONE (OUTPATIENT)
Dept: PRIMARY CARE | Facility: CLINIC | Age: 34
End: 2024-06-14
Payer: MEDICARE

## 2024-06-14 RX ORDER — HYDROCHLOROTHIAZIDE 12.5 MG/1
12.5 TABLET ORAL DAILY
Qty: 90 TABLET | Refills: 0 | Status: SHIPPED | OUTPATIENT
Start: 2024-06-14

## 2024-06-14 RX ORDER — MONTELUKAST SODIUM 10 MG/1
10 TABLET ORAL DAILY
Qty: 90 TABLET | Refills: 0 | Status: SHIPPED | OUTPATIENT
Start: 2024-06-14

## 2024-06-14 NOTE — TELEPHONE ENCOUNTER
----- Message from INESSA Arevalo sent at 6/14/2024  9:34 AM EDT -----  Can you please call patient and remind him that he needs to come in for blood pressure follow up

## 2024-06-19 ENCOUNTER — APPOINTMENT (OUTPATIENT)
Dept: PRIMARY CARE | Facility: CLINIC | Age: 34
End: 2024-06-19
Payer: MEDICARE

## 2024-06-24 ENCOUNTER — APPOINTMENT (OUTPATIENT)
Dept: PRIMARY CARE | Facility: CLINIC | Age: 34
End: 2024-06-24
Payer: MEDICARE

## 2024-06-24 VITALS
SYSTOLIC BLOOD PRESSURE: 141 MMHG | HEIGHT: 68 IN | DIASTOLIC BLOOD PRESSURE: 90 MMHG | BODY MASS INDEX: 46.15 KG/M2 | OXYGEN SATURATION: 96 % | WEIGHT: 304.5 LBS | HEART RATE: 87 BPM | TEMPERATURE: 97.2 F

## 2024-06-24 DIAGNOSIS — I10 HYPERTENSION, UNSPECIFIED TYPE: Primary | ICD-10-CM

## 2024-06-24 DIAGNOSIS — N28.9 RENAL DYSFUNCTION: ICD-10-CM

## 2024-06-24 PROCEDURE — 3080F DIAST BP >= 90 MM HG: CPT | Performed by: NURSE PRACTITIONER

## 2024-06-24 PROCEDURE — 3077F SYST BP >= 140 MM HG: CPT | Performed by: NURSE PRACTITIONER

## 2024-06-24 PROCEDURE — 4010F ACE/ARB THERAPY RXD/TAKEN: CPT | Performed by: NURSE PRACTITIONER

## 2024-06-24 PROCEDURE — 99213 OFFICE O/P EST LOW 20 MIN: CPT | Performed by: NURSE PRACTITIONER

## 2024-06-24 RX ORDER — HYDRALAZINE HYDROCHLORIDE 50 MG/1
50 TABLET, FILM COATED ORAL 2 TIMES DAILY
Qty: 180 TABLET | Refills: 3 | Status: SHIPPED | OUTPATIENT
Start: 2024-06-24 | End: 2025-06-24

## 2024-06-24 NOTE — PROGRESS NOTES
Chief Complaint  Follow-up (Hypertension).    History Of Present Illness  Jose Luis Britton is a 33 y.o. male presents today for follow up of Follow-up (Hypertension).  Presents today with father.    Has not set up w nephrologist as of yet.   Was instructed to follow-up due to DEEPAK on CKD. Does stay well-hydrated drinks water, tea, diet soda.      BP elevated at home but stable in office today.  Tolerating hydralazine well.        Review of Systems  Review of Systems   Constitutional:  Negative for activity change, appetite change, chills and fatigue.   Respiratory:  Negative for cough and shortness of breath.    Cardiovascular:  Negative for chest pain.       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), Glaucoma secondary to other eye disorders, right eye, indeterminate stage (06/07/2019), Glaucoma secondary to other eye disorders, unspecified eye, indeterminate stage (12/28/2018), Hyphema, right eye (12/19/2023), Hypothyroidism, unspecified (11/18/2021), Ocular pain, right eye, Other specified disorders of eye and adnexa (02/08/2019), Other visual disturbances (12/26/2018), Pure hypercholesterolemia, unspecified (01/28/2018), Testicle lump (12/19/2023), Type 1 diabetes mellitus without complications (Multi) (11/14/2022), Unspecified acute and subacute iridocyclitis (12/27/2018), Unspecified blepharitis right eye, unspecified eyelid (08/22/2018), and Unspecified retinal disorder (01/09/2020).    Surgical History  He has a past surgical history that includes Other surgical history (03/29/2022).    Family History  Family History   Problem Relation Name Age of Onset    Diverticulosis Mother      Arthritis Mother      Endometriosis Mother      Migraines Mother      Thyroid disease Mother       "Hypertension Father      Cancer Maternal Grandfather      Other (cardiac disorder) Paternal Grandfather          Social History  He reports that he has never smoked. He has never used smokeless tobacco. He reports that he does not drink alcohol and does not use drugs.    DEPRESSION SCREEN  Over the past 2 weeks, how often have you been bothered by any of the following problems?  Little interest or pleasure in doing things: Not at all  Feeling down, depressed, or hopeless: Not at all      Allergies  Cat dander    Medications  Current Outpatient Medications   Medication Instructions    amLODIPine (NORVASC) 10 mg, oral, Daily, as directed    atorvastatin (LIPITOR) 20 mg, oral, Daily    atropine 1 % ophthalmic solution 1 drop, Both Eyes, 2 times daily    cetirizine (ZYRTEC) 10 mg, oral, Daily    cholecalciferol (VITAMIN D-3) 125 mcg, oral, Daily    dorzolamide-timoloL (Cosopt) 22.3-6.8 mg/mL ophthalmic solution INSTILL 1 DROP IN BOTH EYES TWICE DAILY    erythromycin (Romycin) 5 mg/gram (0.5 %) ophthalmic ointment 1 Application, Both Eyes, Nightly    fenofibrate (TRICOR) 48 mg, oral, Daily    FreeStyle Deep 2 Sensor kit 1 each, subcutaneous, Every 14 days    hydrALAZINE (APRESOLINE) 50 mg, oral, 2 times daily    hydroCHLOROthiazide (MICROZIDE) 12.5 mg, oral, Daily    insulin NPH, Isophane, (NovoLIN N NPH U-100 Insulin) 100 unit/mL injection inject 65 UNITS IN THE MORNING and 70 UNITS IN THE EVENING at bedtime    insulin regular (NovoLIN R Regular U100 Insulin) 100 unit/mL injection INJECT 100 UNIT Daily for daily meal insulin needs.    insulin syringe-needle U-100 1 mL 30 gauge x 1/2\" syringe Use to inject 4 times daily as directed.    krill-om-3-dha-epa-phospho-ast (krill oil) 1,[id number] mg capsule oral    levothyroxine (SYNTHROID, LEVOXYL) 112 mcg, oral, Daily    lisinopril 20 mg, oral, Daily    metoprolol succinate XL (TOPROL-XL) 100 mg, oral, Daily    montelukast (SINGULAIR) 10 mg, oral, Daily    prednisoLONE " "acetate (Pred-Forte) 1 % ophthalmic suspension 1 drop, Both Eyes, 2 times daily    sertraline (ZOLOFT) 50 mg, oral, Daily        Objective   /90 (BP Location: Left arm, Patient Position: Sitting, BP Cuff Size: Adult)   Pulse 87   Temp 36.2 °C (97.2 °F) (Temporal)   Ht 1.727 m (5' 8\")   Wt 138 kg (304 lb 8 oz)   SpO2 96%   BMI 46.30 kg/m²    BMI: Estimated body mass index is 46.3 kg/m² as calculated from the following:    Height as of this encounter: 1.727 m (5' 8\").    Weight as of this encounter: 138 kg (304 lb 8 oz).    Physical Exam  Physical Exam  Vitals and nursing note reviewed.   Constitutional:       Appearance: Normal appearance. He is obese.   HENT:      Head: Normocephalic and atraumatic.      Nose: Nose normal.      Mouth/Throat:      Mouth: Mucous membranes are moist.      Pharynx: Oropharynx is clear.   Eyes:      General: Lids are normal.      Comments: Blind    Cardiovascular:      Rate and Rhythm: Normal rate and regular rhythm.      Heart sounds: Normal heart sounds.   Pulmonary:      Effort: Pulmonary effort is normal.      Breath sounds: Normal breath sounds.   Abdominal:      Palpations: Abdomen is soft.   Musculoskeletal:         General: Normal range of motion.      Cervical back: Neck supple.   Skin:     General: Skin is warm and dry.   Neurological:      General: No focal deficit present.      Mental Status: He is alert and oriented to person, place, and time. Mental status is at baseline.   Psychiatric:         Mood and Affect: Mood normal.         Behavior: Behavior normal.         Thought Content: Thought content normal.         Judgment: Judgment normal.         Relevant Results and Imaging  Lab on 05/15/2024   Component Date Value Ref Range Status    Glucose 05/15/2024 215 (H)  74 - 99 mg/dL Final    Sodium 05/15/2024 134 (L)  136 - 145 mmol/L Final    Potassium 05/15/2024 4.4  3.5 - 5.3 mmol/L Final    Chloride 05/15/2024 102  98 - 107 mmol/L Final    Bicarbonate 05/15/2024 " 23  21 - 32 mmol/L Final    Anion Gap 05/15/2024 13  10 - 20 mmol/L Final    Urea Nitrogen 05/15/2024 26 (H)  6 - 23 mg/dL Final    Creatinine 05/15/2024 2.54 (H)  0.50 - 1.30 mg/dL Final    eGFR 05/15/2024 34 (L)  >60 mL/min/1.73m*2 Final    Calculations of estimated GFR are performed using the 2021 CKD-EPI Study Refit equation without the race variable for the IDMS-Traceable creatinine methods.  https://jasn.asnjournals.org/content/early/2021/09/22/ASN.9828081720    Calcium 05/15/2024 8.6  8.6 - 10.6 mg/dL Final   Office Visit on 12/19/2023   Component Date Value Ref Range Status    Albumin, Urine Random 12/19/2023 1,585.7  Not established mg/L Final    Creatinine, Urine Random 12/19/2023 73.3  20.0 - 370.0 mg/dL Final    Albumin/Creatinine Ratio 12/19/2023 2,163.3 (H)  <30.0 ug/mg Creat Final   Orders Only on 08/09/2023   Component Date Value Ref Range Status    TSH 08/09/2023 6.12 (H)  0.44 - 3.98 mIU/L Final    Comment:  TSH testing is performed using different testing    methodology at [facility] than at other    [facility]. Direct result comparisons should    only be made within the same method.      Cholesterol 08/09/2023 189  0 - 199 mg/dL Final    Comment: .      AGE      DESIRABLE   BORDERLINE HIGH   HIGH     0-19 Y     0 - 169       170 - 199     >/= 200    20-24 Y     0 - 189       190 - 224     >/= 225         >24 Y     0 - 199       200 - 239     >/= 240   **All ranges are based on fasting samples. Specific   therapeutic targets will vary based on patient-specific   cardiac risk.  .   Pediatric guidelines reference:Pediatrics 2011, 128(S5).   Adult guidelines reference: NCEP ATPIII Guidelines,     YAZAN 2001, 258:2486-97  .   Venipuncture immediately after or during the    administration of Metamizole may lead to falsely   low results. Testing should be performed immediately   prior to Metamizole dosing.      HDL 08/09/2023 26.1 (A)  mg/dL Final    Comment: .      AGE      VERY LOW    LOW     NORMAL    HIGH       0-19 Y       < 35   < 40     40-45     ----    20-24 Y       ----   < 40       >45     ----      >24 Y       ----   < 40     40-60      >60  .      Cholesterol/HDL Ratio 08/09/2023 7.2 (A)   Final    Comment: REF VALUES  DESIRABLE  < 3.4  HIGH RISK  > 5.0      LDL 08/09/2023 -  0 - 99 mg/dL Final    Comment: .                           NEAR      BORD      AGE      DESIRABLE  OPTIMAL    HIGH     HIGH     VERY HIGH     0-19 Y     0 - 109     ---    110-129   >/= 130     ----    20-24 Y     0 - 119     ---    120-159   >/= 160     ----      >24 Y     0 -  99   100-129  130-159   160-189     >/=190  .  THE CALCULATION OF LDL AND VLDL ARE INACCURATE  WHEN TRIGLYCERIDES ARE GREATER THAN 400 MG/DL  OR WHEN THE PATIENT IS NON-FASTING. IF LDL  MEASUREMENT IS NECESSARY CONTACT THE TESTING  LABORATORY FOR AN ALTERNATIVE LDL ASSAY.      VLDL 08/09/2023 SEE COMMENT  0 - 40 mg/dL Final      Unable to calculate VLDL.    Triglycerides 08/09/2023 545 (H)  0 - 149 mg/dL Final    Comment: .      AGE      DESIRABLE   BORDERLINE HIGH   HIGH     VERY HIGH   0 D-90 D    19 - 174         ----         ----        ----  91 D- 9 Y     0 -  74        75 -  99     >/= 100      ----    10-19 Y     0 -  89        90 - 129     >/= 130      ----    20-24 Y     0 - 114       115 - 149     >/= 150      ----         >24 Y     0 - 149       150 - 199    200- 499    >/= 500  .   Venipuncture immediately after or during the    administration of Metamizole may lead to falsely   low results. Testing should be performed immediately   prior to Metamizole dosing.      Free T4 08/09/2023 0.79  0.61 - 1.12 ng/dL Final    Comment:  Thyroxine Free testing is performed using different testing    methodology at [facility] than at other    [facility]. Direct result comparisons should    only be made within the same method.  .   Biotin can cause falsely elevated free T4 results. Patients   taking a Biotin dose of up  to 10 mg/day should refrain from   taking Biotin for 24 hours before sample collection. Patient   taking a Biotin dose of >10 mg/day should consult with their   physician or the laboratory before the blood draw.      LDL Direct 08/09/2023 85  0 - 129 mg/dL Final    Comment: Elevated levels of LDL cholesterol are recognized as a key   factor in the development of atherosclerosis and CHD. The   direct LDL cholesterol test can be used to assess   cardiovascular risk and monitor therapy as a follow up to   a lipid profile when triglycerides are significantly elevated.       No images are attached to the encounter.        Assessment and Plan  Problem List Items Addressed This Visit             ICD-10-CM    Hypertension - Primary I10     Managed on amlodipine, hydrochlorothiazide, and metoprolol   -on lisinopril until 4/30/24- Dc'd due to DEEPAK and hyperkalemia.    -5/15/24-repeat basic metabolic panel-hyperkalemia resolved.   Creatinine stable/improving (2.6 improved to 2.54)  -due to hypertension started hydralazine 25 mg p.o. twice daily for management of blood pressure, -6/24/2024: Hydralazine increased to 50 mg twice a day  - Continue to monitor blood pressures at home  Follow-up in 1 month               Relevant Medications    hydrALAZINE (Apresoline) 50 mg tablet    Other Relevant Orders    Basic metabolic panel    Renal dysfunction N28.9     Chronic, creatinine elevated patient with diabetes  Referred to nephrology-patient still has not set up appointment reminded to do so  Creatinine baseline 1.5 to 2.7--now stabilized at 2.6 after discontinuation of lisinopril  Urine albumin very elevated         Relevant Orders    Basic metabolic panel

## 2024-06-25 ASSESSMENT — ENCOUNTER SYMPTOMS
COUGH: 0
APPETITE CHANGE: 0
CHILLS: 0
FATIGUE: 0
SHORTNESS OF BREATH: 0
ACTIVITY CHANGE: 0

## 2024-06-25 NOTE — ASSESSMENT & PLAN NOTE
Managed on amlodipine, hydrochlorothiazide, and metoprolol   -on lisinopril until 4/30/24- Dc'd due to DEEPAK and hyperkalemia.    -5/15/24-repeat basic metabolic panel-hyperkalemia resolved.   Creatinine stable/improving (2.6 improved to 2.54)  -due to hypertension started hydralazine 25 mg p.o. twice daily for management of blood pressure, -6/24/2024: Hydralazine increased to 50 mg twice a day  - Continue to monitor blood pressures at home  Follow-up in 1 month

## 2024-06-26 NOTE — ASSESSMENT & PLAN NOTE
Chronic, creatinine elevated patient with diabetes  Referred to nephrology-patient still has not set up appointment reminded to do so  Creatinine baseline 1.5 to 2.7--now stabilized at 2.6 after discontinuation of lisinopril  Urine albumin very elevated

## 2024-07-04 DIAGNOSIS — E10.9 TYPE 1 DIABETES MELLITUS WITHOUT COMPLICATIONS (MULTI): ICD-10-CM

## 2024-07-04 RX ORDER — HUMAN INSULIN 100 [IU]/ML
INJECTION, SUSPENSION SUBCUTANEOUS
Qty: 120 ML | Refills: 3 | Status: SHIPPED | OUTPATIENT
Start: 2024-07-04

## 2024-07-04 RX ORDER — HUMAN INSULIN 100 [IU]/ML
INJECTION, SOLUTION SUBCUTANEOUS
Qty: 350 ML | Refills: 3 | Status: SHIPPED | OUTPATIENT
Start: 2024-07-04

## 2024-07-10 ENCOUNTER — APPOINTMENT (OUTPATIENT)
Dept: OPHTHALMOLOGY | Facility: CLINIC | Age: 34
End: 2024-07-10
Payer: MEDICARE

## 2024-07-24 ENCOUNTER — APPOINTMENT (OUTPATIENT)
Dept: PRIMARY CARE | Facility: CLINIC | Age: 34
End: 2024-07-24
Payer: MEDICARE

## 2024-07-25 DIAGNOSIS — E03.9 HYPOTHYROIDISM, UNSPECIFIED TYPE: ICD-10-CM

## 2024-07-27 RX ORDER — LEVOTHYROXINE SODIUM 112 UG/1
112 TABLET ORAL DAILY
Qty: 30 TABLET | Refills: 0 | Status: SHIPPED | OUTPATIENT
Start: 2024-07-27

## 2024-07-29 ENCOUNTER — APPOINTMENT (OUTPATIENT)
Dept: PRIMARY CARE | Facility: CLINIC | Age: 34
End: 2024-07-29
Payer: MEDICARE

## 2024-08-06 ENCOUNTER — APPOINTMENT (OUTPATIENT)
Dept: PRIMARY CARE | Facility: CLINIC | Age: 34
End: 2024-08-06
Payer: MEDICARE

## 2024-08-15 ENCOUNTER — TELEPHONE (OUTPATIENT)
Dept: RHEUMATOLOGY | Facility: CLINIC | Age: 34
End: 2024-08-15
Payer: COMMERCIAL

## 2024-08-15 DIAGNOSIS — E10.9 TYPE 1 DIABETES MELLITUS WITHOUT COMPLICATIONS (MULTI): ICD-10-CM

## 2024-08-16 RX ORDER — ATORVASTATIN CALCIUM 20 MG/1
20 TABLET, FILM COATED ORAL DAILY
Qty: 90 TABLET | Refills: 0 | Status: SHIPPED | OUTPATIENT
Start: 2024-08-16

## 2024-08-21 ENCOUNTER — APPOINTMENT (OUTPATIENT)
Dept: PRIMARY CARE | Facility: CLINIC | Age: 34
End: 2024-08-21
Payer: MEDICARE

## 2024-08-26 ENCOUNTER — TELEPHONE (OUTPATIENT)
Dept: RHEUMATOLOGY | Facility: CLINIC | Age: 34
End: 2024-08-26
Payer: COMMERCIAL

## 2024-08-26 NOTE — TELEPHONE ENCOUNTER
----- Message from Ruby Chase sent at 8/9/2024  2:00 PM EDT -----    ----- Message -----  From: Lilibeth Weeks CMA  Sent: 8/9/2024   1:52 PM EDT  To: INESSA Arevalo    Which patient, no name attached  ----- Message -----  From: INESSA Arevalo  Sent: 8/9/2024  11:39 AM EDT  To: Lilibeth Weeks CMA    Please advise patient that he needs TSH (thyroid blood work done).   I have sent in a 30 day supply of med but he needs blood work before we can send more

## 2024-08-28 ENCOUNTER — LAB (OUTPATIENT)
Dept: LAB | Facility: LAB | Age: 34
End: 2024-08-28
Payer: COMMERCIAL

## 2024-08-28 DIAGNOSIS — E03.9 HYPOTHYROIDISM, UNSPECIFIED TYPE: ICD-10-CM

## 2024-08-28 DIAGNOSIS — N28.9 RENAL DYSFUNCTION: ICD-10-CM

## 2024-08-28 DIAGNOSIS — I10 HYPERTENSION, UNSPECIFIED TYPE: ICD-10-CM

## 2024-08-28 LAB
ANION GAP SERPL CALC-SCNC: 15 MMOL/L (ref 10–20)
BUN SERPL-MCNC: 23 MG/DL (ref 6–23)
CALCIUM SERPL-MCNC: 8.8 MG/DL (ref 8.6–10.6)
CHLORIDE SERPL-SCNC: 100 MMOL/L (ref 98–107)
CO2 SERPL-SCNC: 26 MMOL/L (ref 21–32)
CREAT SERPL-MCNC: 2.92 MG/DL (ref 0.5–1.3)
EGFRCR SERPLBLD CKD-EPI 2021: 28 ML/MIN/1.73M*2
GLUCOSE SERPL-MCNC: 130 MG/DL (ref 74–99)
POTASSIUM SERPL-SCNC: 4.5 MMOL/L (ref 3.5–5.3)
SODIUM SERPL-SCNC: 136 MMOL/L (ref 136–145)

## 2024-08-28 PROCEDURE — 84439 ASSAY OF FREE THYROXINE: CPT

## 2024-08-28 PROCEDURE — 80048 BASIC METABOLIC PNL TOTAL CA: CPT

## 2024-08-28 PROCEDURE — 84443 ASSAY THYROID STIM HORMONE: CPT

## 2024-08-29 DIAGNOSIS — E03.9 HYPOTHYROIDISM, UNSPECIFIED TYPE: ICD-10-CM

## 2024-08-29 DIAGNOSIS — H40.51X4 GLAUCOMA SECONDARY TO OTHER EYE DISORDERS, RIGHT EYE, INDETERMINATE STAGE: ICD-10-CM

## 2024-08-29 RX ORDER — DORZOLAMIDE HYDROCHLORIDE AND TIMOLOL MALEATE 20; 5 MG/ML; MG/ML
SOLUTION/ DROPS OPHTHALMIC
Qty: 50 ML | Refills: 0 | OUTPATIENT
Start: 2024-08-29

## 2024-08-30 DIAGNOSIS — E03.9 HYPOTHYROIDISM, UNSPECIFIED TYPE: Primary | ICD-10-CM

## 2024-08-30 LAB
T4 FREE SERPL-MCNC: 1.36 NG/DL (ref 0.78–1.48)
TSH SERPL-ACNC: 4.31 MIU/L (ref 0.44–3.98)

## 2024-09-03 RX ORDER — LEVOTHYROXINE SODIUM 112 UG/1
TABLET ORAL
Qty: 30 TABLET | Refills: 0 | OUTPATIENT
Start: 2024-09-03

## 2024-09-03 RX ORDER — LEVOTHYROXINE SODIUM 125 UG/1
125 TABLET ORAL DAILY
Qty: 90 TABLET | Refills: 0 | Status: SHIPPED | OUTPATIENT
Start: 2024-09-03 | End: 2024-12-02

## 2024-09-07 DIAGNOSIS — E10.3519: ICD-10-CM

## 2024-09-09 RX ORDER — PREDNISOLONE ACETATE 10 MG/ML
SUSPENSION/ DROPS OPHTHALMIC
Qty: 15 ML | Refills: 2 | OUTPATIENT
Start: 2024-09-09

## 2024-09-25 DIAGNOSIS — H40.51X4 GLAUCOMA SECONDARY TO OTHER EYE DISORDERS, RIGHT EYE, INDETERMINATE STAGE: ICD-10-CM

## 2024-09-25 RX ORDER — DORZOLAMIDE HYDROCHLORIDE AND TIMOLOL MALEATE 20; 5 MG/ML; MG/ML
SOLUTION/ DROPS OPHTHALMIC
Qty: 50 ML | Refills: 0 | Status: SHIPPED | OUTPATIENT
Start: 2024-09-25

## 2024-09-26 DIAGNOSIS — E78.1 PURE HYPERGLYCERIDEMIA: ICD-10-CM

## 2024-09-26 RX ORDER — FENOFIBRATE 48 MG/1
48 TABLET, FILM COATED ORAL DAILY
Qty: 90 TABLET | Refills: 0 | Status: SHIPPED | OUTPATIENT
Start: 2024-09-26

## 2024-10-28 ENCOUNTER — TELEPHONE (OUTPATIENT)
Dept: PRIMARY CARE | Facility: CLINIC | Age: 34
End: 2024-10-28
Payer: COMMERCIAL

## 2024-10-28 DIAGNOSIS — H40.53X1 NEOVASCULAR GLAUCOMA OF BOTH EYES, MILD STAGE: Primary | ICD-10-CM

## 2024-10-29 RX ORDER — PREDNISOLONE ACETATE 10 MG/ML
1 SUSPENSION/ DROPS OPHTHALMIC 2 TIMES DAILY
Qty: 5 ML | Refills: 1 | Status: SHIPPED | OUTPATIENT
Start: 2024-10-29 | End: 2024-11-28

## 2024-11-01 ENCOUNTER — APPOINTMENT (OUTPATIENT)
Dept: PRIMARY CARE | Facility: CLINIC | Age: 34
End: 2024-11-01
Payer: COMMERCIAL

## 2024-11-04 ENCOUNTER — TELEPHONE (OUTPATIENT)
Dept: RHEUMATOLOGY | Facility: CLINIC | Age: 34
End: 2024-11-04
Payer: COMMERCIAL

## 2024-11-04 DIAGNOSIS — I10 ESSENTIAL (PRIMARY) HYPERTENSION: ICD-10-CM

## 2024-11-04 DIAGNOSIS — J30.81 ALLERGY TO CATS: ICD-10-CM

## 2024-11-04 DIAGNOSIS — E10.9 TYPE 1 DIABETES MELLITUS WITHOUT COMPLICATIONS: ICD-10-CM

## 2024-11-04 DIAGNOSIS — J30.81 ALLERGIC RHINITIS DUE TO ANIMAL (CAT) (DOG) HAIR AND DANDER: ICD-10-CM

## 2024-11-04 RX ORDER — ATORVASTATIN CALCIUM 20 MG/1
20 TABLET, FILM COATED ORAL DAILY
Qty: 90 TABLET | Refills: 0 | Status: SHIPPED | OUTPATIENT
Start: 2024-11-04

## 2024-11-04 RX ORDER — HYDROCHLOROTHIAZIDE 12.5 MG/1
12.5 TABLET ORAL DAILY
Qty: 90 TABLET | Refills: 0 | Status: SHIPPED | OUTPATIENT
Start: 2024-11-04

## 2024-11-04 RX ORDER — MONTELUKAST SODIUM 10 MG/1
10 TABLET ORAL DAILY
Qty: 90 TABLET | Refills: 0 | Status: SHIPPED | OUTPATIENT
Start: 2024-11-04

## 2024-11-04 NOTE — TELEPHONE ENCOUNTER
Received a fax from [facility] in [place] for a refill of atorvastatin (Lipitor) 20 mg tablet, montelukast (Singulair) 10 mg tablet, and hydroCHLOROthiazide (Microzide) 12.5 mg tablet.

## 2024-11-11 ENCOUNTER — APPOINTMENT (OUTPATIENT)
Dept: PRIMARY CARE | Facility: CLINIC | Age: 34
End: 2024-11-11
Payer: MEDICARE

## 2024-11-11 VITALS
HEART RATE: 82 BPM | HEIGHT: 68 IN | SYSTOLIC BLOOD PRESSURE: 138 MMHG | BODY MASS INDEX: 46.32 KG/M2 | OXYGEN SATURATION: 95 % | WEIGHT: 305.6 LBS | TEMPERATURE: 97.5 F | DIASTOLIC BLOOD PRESSURE: 84 MMHG

## 2024-11-11 DIAGNOSIS — N18.31 STAGE 3A CHRONIC KIDNEY DISEASE (MULTI): ICD-10-CM

## 2024-11-11 DIAGNOSIS — E11.39 TYPE 2 DIABETES MELLITUS WITH OTHER OPHTHALMIC COMPLICATION, WITH LONG-TERM CURRENT USE OF INSULIN: ICD-10-CM

## 2024-11-11 DIAGNOSIS — Z23 ENCOUNTER FOR IMMUNIZATION: ICD-10-CM

## 2024-11-11 DIAGNOSIS — Z79.4 TYPE 2 DIABETES MELLITUS WITH OTHER OPHTHALMIC COMPLICATION, WITH LONG-TERM CURRENT USE OF INSULIN: ICD-10-CM

## 2024-11-11 DIAGNOSIS — E10.3519: Primary | ICD-10-CM

## 2024-11-11 DIAGNOSIS — E66.813 CLASS 3 SEVERE OBESITY WITH SERIOUS COMORBIDITY AND BODY MASS INDEX (BMI) OF 45.0 TO 49.9 IN ADULT, UNSPECIFIED OBESITY TYPE: ICD-10-CM

## 2024-11-11 DIAGNOSIS — E78.2 MIXED HYPERLIPIDEMIA: ICD-10-CM

## 2024-11-11 DIAGNOSIS — E66.01 CLASS 3 SEVERE OBESITY WITH SERIOUS COMORBIDITY AND BODY MASS INDEX (BMI) OF 45.0 TO 49.9 IN ADULT, UNSPECIFIED OBESITY TYPE: ICD-10-CM

## 2024-11-11 DIAGNOSIS — E03.9 ACQUIRED HYPOTHYROIDISM: ICD-10-CM

## 2024-11-11 PROBLEM — N18.30 STAGE 3 CHRONIC KIDNEY DISEASE (MULTI): Status: ACTIVE | Noted: 2024-11-11

## 2024-11-11 PROBLEM — E11.319 ADVANCED DIABETIC MACULOPATHY (MULTI): Status: ACTIVE | Noted: 2024-11-11

## 2024-11-11 PROBLEM — G56.00 CARPAL TUNNEL SYNDROME: Status: ACTIVE | Noted: 2024-11-11

## 2024-11-11 PROBLEM — H57.89 SWELLING OF EYE: Status: ACTIVE | Noted: 2024-11-11

## 2024-11-11 LAB
NON-UH HIE A/G RATIO: 0.9
NON-UH HIE ALB: 2.9 G/DL (ref 3.4–5)
NON-UH HIE ALK PHOS: 129 UNIT/L (ref 45–117)
NON-UH HIE BILIRUBIN, TOTAL: 0.2 MG/DL (ref 0.3–1.2)
NON-UH HIE BUN/CREAT RATIO: 8.2
NON-UH HIE BUN: 28 MG/DL (ref 9–23)
NON-UH HIE CALCIUM: 9.1 MG/DL (ref 8.7–10.4)
NON-UH HIE CALCULATED LDL CHOLESTEROL: ABNORMAL MG/DL (ref 60–130)
NON-UH HIE CALCULATED OSMOLALITY: 285 MOSM/KG (ref 275–295)
NON-UH HIE CHLORIDE: 105 MMOL/L (ref 98–107)
NON-UH HIE CHOLESTEROL: 216 MG/DL (ref 100–200)
NON-UH HIE CO2, VENOUS: 27 MMOL/L (ref 20–31)
NON-UH HIE CREATININE: 3.4 MG/DL (ref 0.6–1.1)
NON-UH HIE GFR AA: 25
NON-UH HIE GLOBULIN: 3.4 G/DL
NON-UH HIE GLOMERULAR FILTRATION RATE: 21 ML/MIN/1.73M?
NON-UH HIE GLUCOSE: 91 MG/DL (ref 74–106)
NON-UH HIE GOT: 24 UNIT/L (ref 15–37)
NON-UH HIE GPT: 40 UNIT/L (ref 10–49)
NON-UH HIE HDL CHOLESTEROL: 30 MG/DL (ref 40–60)
NON-UH HIE K: 4.7 MMOL/L (ref 3.5–5.1)
NON-UH HIE NA: 140 MMOL/L (ref 135–145)
NON-UH HIE TOTAL CHOL/HDL CHOL RATIO: 7.2
NON-UH HIE TOTAL PROTEIN: 6.3 G/DL (ref 5.7–8.2)
NON-UH HIE TRIGLYCERIDES: 409 MG/DL (ref 30–150)

## 2024-11-11 PROCEDURE — 3008F BODY MASS INDEX DOCD: CPT | Performed by: FAMILY MEDICINE

## 2024-11-11 PROCEDURE — 3051F HG A1C>EQUAL 7.0%<8.0%: CPT | Performed by: FAMILY MEDICINE

## 2024-11-11 PROCEDURE — 1036F TOBACCO NON-USER: CPT | Performed by: FAMILY MEDICINE

## 2024-11-11 PROCEDURE — G0008 ADMIN INFLUENZA VIRUS VAC: HCPCS | Performed by: FAMILY MEDICINE

## 2024-11-11 PROCEDURE — 90656 IIV3 VACC NO PRSV 0.5 ML IM: CPT | Performed by: FAMILY MEDICINE

## 2024-11-11 PROCEDURE — 3079F DIAST BP 80-89 MM HG: CPT | Performed by: FAMILY MEDICINE

## 2024-11-11 PROCEDURE — 99214 OFFICE O/P EST MOD 30 MIN: CPT | Performed by: FAMILY MEDICINE

## 2024-11-11 PROCEDURE — 3075F SYST BP GE 130 - 139MM HG: CPT | Performed by: FAMILY MEDICINE

## 2024-11-11 ASSESSMENT — PATIENT HEALTH QUESTIONNAIRE - PHQ9
2. FEELING DOWN, DEPRESSED OR HOPELESS: NOT AT ALL
SUM OF ALL RESPONSES TO PHQ9 QUESTIONS 1 AND 2: 0
1. LITTLE INTEREST OR PLEASURE IN DOING THINGS: NOT AT ALL

## 2024-11-11 NOTE — PROGRESS NOTES
"Subjective   Patient ID: Jose Luis Britton is a 34 y.o. male who presents for New Patient Visit (Medication Management ).  Here to establish  Diabetes with retinopathy  Blindness right eye  Chronic kidney disease  Hypothyroidism  Hyperlipidemia and hypertension  Parents are his caregivers        Review of Systems  Constitutional: no chills, no fever and no night sweats.   Eyes: no blurred vision and no eyesight problems.   ENT: no hearing loss, no nasal congestion, no nasal discharge, no hoarseness and no sore throat.   Cardiovascular: no chest pain, no intermittent leg claudication, no lower extremity edema, no palpitations and no syncope.   Respiratory: no cough, no shortness of breath during exertion, no shortness of breath at rest and no wheezing.   Gastrointestinal: no abdominal pain, no blood in stools, no constipation, no diarrhea, no melena, no nausea, no rectal pain and no vomiting.   Genitourinary: no dysuria, no change in urinary frequency, no urinary hesitancy, no feelings of urinary urgency and no vaginal discharge.   Musculoskeletal: no arthralgias,  no back pain and no myalgias.   Integumentary: no new skin lesions and no rashes.   Neurological: no difficulty walking, no headache, no limb weakness, no numbness and no tingling.   Psychiatric: no anxiety, no depression, no anhedonia and no substance use disorders.   Endocrine: no recent weight gain and no recent weight loss.   Hematologic/Lymphatic: no tendency for easy bruising and no swollen glands .    Objective    /84   Pulse 82   Temp 36.4 °C (97.5 °F) (Temporal)   Ht 1.727 m (5' 8\")   Wt 139 kg (305 lb 9.6 oz)   SpO2 95%   BMI 46.47 kg/m²    Physical Exam  The patient appeared well nourished and normally developed. Vital signs as documented. Head exam is unremarkable. No scleral icterus or corneal arcus noted.  Pupils are equal round reactive to light extraocular movements are intact no hemorrhages noted on funduscopic exam mouth mucous " membranes are moist no exudates ears canals clear TMs are gray pearly not injected nose no rhinorrhea or epistaxis Neck is without jugular venous distension, thyromegaly, or carotid bruits. Carotid upstrokes are brisk bilaterally. Lungs are clear to auscultation and percussion. Cardiac exam reveals the PMI to be normally sized and situated. Rhythm is regular. First and second heart sounds normal. No murmurs, rubs or gallops. Abdominal exam reveals normal bowel sounds, no masses, no organomegaly and no aortic enlargement. Extremities are nonedematous and both femoral and pedal pulses are normal.  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; lesser eye: blind, with macular edema, with proliferative retinopathy, associated with type 1 diabetes mellitus - Primary    Relevant Orders    Comprehensive metabolic panel    Hemoglobin A1C    Acquired hypothyroidism    Stage 3 chronic kidney disease (Multi)     Stable at this time  No issues   Continue to monitor regularly           Class 3 severe obesity with serious comorbidity and body mass index (BMI) of 45.0 to 49.9 in adult     Other Visit Diagnoses       Encounter for immunization        Relevant Orders    Flu vaccine, trivalent, preservative free, age 6 months and greater (Fluarix/Fluzone/Flulaval) (Completed)                 Marion Maldonado MD

## 2024-11-30 PROBLEM — E66.01 OBESITY, MORBID (MULTI): Status: RESOLVED | Noted: 2023-12-21 | Resolved: 2024-11-30

## 2024-11-30 PROBLEM — E66.813 CLASS 3 SEVERE OBESITY WITH SERIOUS COMORBIDITY AND BODY MASS INDEX (BMI) OF 45.0 TO 49.9 IN ADULT: Status: ACTIVE | Noted: 2024-11-30

## 2024-11-30 PROBLEM — E66.01 CLASS 3 SEVERE OBESITY WITH SERIOUS COMORBIDITY AND BODY MASS INDEX (BMI) OF 45.0 TO 49.9 IN ADULT: Status: ACTIVE | Noted: 2024-11-30

## 2024-12-12 DIAGNOSIS — E10.9 TYPE 1 DIABETES MELLITUS WITHOUT COMPLICATIONS: ICD-10-CM

## 2024-12-12 DIAGNOSIS — E03.9 HYPOTHYROIDISM, UNSPECIFIED TYPE: ICD-10-CM

## 2024-12-12 DIAGNOSIS — J30.81 ALLERGY TO CATS: ICD-10-CM

## 2024-12-12 DIAGNOSIS — E78.2 MIXED HYPERLIPIDEMIA: Primary | ICD-10-CM

## 2024-12-12 DIAGNOSIS — J30.81 ALLERGIC RHINITIS DUE TO ANIMAL (CAT) (DOG) HAIR AND DANDER: ICD-10-CM

## 2024-12-12 DIAGNOSIS — E03.9 ACQUIRED HYPOTHYROIDISM: ICD-10-CM

## 2024-12-12 RX ORDER — LEVOTHYROXINE SODIUM 125 UG/1
125 TABLET ORAL DAILY
Qty: 90 TABLET | Refills: 3 | Status: SHIPPED | OUTPATIENT
Start: 2024-12-12

## 2024-12-12 RX ORDER — ATORVASTATIN CALCIUM 20 MG/1
20 TABLET, FILM COATED ORAL DAILY
Qty: 90 TABLET | Refills: 3 | Status: SHIPPED | OUTPATIENT
Start: 2024-12-12

## 2024-12-12 RX ORDER — MONTELUKAST SODIUM 10 MG/1
10 TABLET ORAL DAILY
Qty: 90 TABLET | Refills: 3 | Status: SHIPPED | OUTPATIENT
Start: 2024-12-12

## 2024-12-14 DIAGNOSIS — H40.53X1 NEOVASCULAR GLAUCOMA OF BOTH EYES, MILD STAGE: ICD-10-CM

## 2024-12-15 RX ORDER — PREDNISOLONE ACETATE 10 MG/ML
SUSPENSION/ DROPS OPHTHALMIC
Qty: 5 ML | Refills: 1 | OUTPATIENT
Start: 2024-12-15

## 2024-12-20 DIAGNOSIS — H40.53X1 NEOVASCULAR GLAUCOMA OF BOTH EYES, MILD STAGE: ICD-10-CM

## 2024-12-20 RX ORDER — PREDNISOLONE ACETATE 10 MG/ML
SUSPENSION/ DROPS OPHTHALMIC
Qty: 5 ML | Refills: 1 | OUTPATIENT
Start: 2024-12-20

## 2024-12-26 DIAGNOSIS — E78.1 PURE HYPERGLYCERIDEMIA: ICD-10-CM

## 2024-12-26 RX ORDER — FENOFIBRATE 48 MG/1
48 TABLET, FILM COATED ORAL DAILY
Qty: 90 TABLET | Refills: 0 | Status: SHIPPED | OUTPATIENT
Start: 2024-12-26

## 2025-01-02 DIAGNOSIS — H40.53X1 NEOVASCULAR GLAUCOMA OF BOTH EYES, MILD STAGE: ICD-10-CM

## 2025-01-02 RX ORDER — PREDNISOLONE ACETATE 10 MG/ML
SUSPENSION/ DROPS OPHTHALMIC
Qty: 5 ML | Refills: 1 | OUTPATIENT
Start: 2025-01-02

## 2025-01-03 DIAGNOSIS — H40.53X1 NEOVASCULAR GLAUCOMA OF BOTH EYES, MILD STAGE: ICD-10-CM

## 2025-01-03 RX ORDER — PREDNISOLONE ACETATE 10 MG/ML
1 SUSPENSION/ DROPS OPHTHALMIC 2 TIMES DAILY
Qty: 5 ML | Refills: 1 | Status: SHIPPED | OUTPATIENT
Start: 2025-01-03 | End: 2025-02-02

## 2025-01-06 DIAGNOSIS — H40.53X1 NEOVASCULAR GLAUCOMA OF BOTH EYES, MILD STAGE: ICD-10-CM

## 2025-01-06 RX ORDER — PREDNISOLONE ACETATE 10 MG/ML
1 SUSPENSION/ DROPS OPHTHALMIC 2 TIMES DAILY
Qty: 5 ML | Refills: 11 | Status: SHIPPED | OUTPATIENT
Start: 2025-01-06 | End: 2025-02-05

## 2025-02-07 DIAGNOSIS — I10 ESSENTIAL (PRIMARY) HYPERTENSION: ICD-10-CM

## 2025-02-09 RX ORDER — HYDROCHLOROTHIAZIDE 12.5 MG/1
12.5 TABLET ORAL DAILY
Qty: 90 TABLET | Refills: 0 | Status: SHIPPED | OUTPATIENT
Start: 2025-02-09

## 2025-02-27 DIAGNOSIS — I10 HYPERTENSION, UNSPECIFIED TYPE: ICD-10-CM

## 2025-02-27 RX ORDER — AMLODIPINE BESYLATE 10 MG/1
10 TABLET ORAL DAILY
Qty: 90 TABLET | Refills: 3 | Status: SHIPPED | OUTPATIENT
Start: 2025-02-27 | End: 2026-02-27

## 2025-03-13 DIAGNOSIS — H40.51X4 GLAUCOMA SECONDARY TO OTHER EYE DISORDERS, RIGHT EYE, INDETERMINATE STAGE: ICD-10-CM

## 2025-03-13 DIAGNOSIS — F32.A ANXIETY AND DEPRESSION: ICD-10-CM

## 2025-03-13 DIAGNOSIS — F41.9 ANXIETY AND DEPRESSION: ICD-10-CM

## 2025-03-13 RX ORDER — SERTRALINE HYDROCHLORIDE 50 MG/1
50 TABLET, FILM COATED ORAL DAILY
Qty: 90 TABLET | Refills: 3 | Status: SHIPPED | OUTPATIENT
Start: 2025-03-13 | End: 2026-03-13

## 2025-03-13 RX ORDER — DORZOLAMIDE HYDROCHLORIDE AND TIMOLOL MALEATE 20; 5 MG/ML; MG/ML
1 SOLUTION/ DROPS OPHTHALMIC 2 TIMES DAILY
Qty: 10 ML | Refills: 0 | OUTPATIENT
Start: 2025-03-13

## 2025-03-13 RX ORDER — DORZOLAMIDE HYDROCHLORIDE AND TIMOLOL MALEATE 20; 5 MG/ML; MG/ML
1 SOLUTION/ DROPS OPHTHALMIC 2 TIMES DAILY
Qty: 50 ML | Refills: 1 | Status: SHIPPED | OUTPATIENT
Start: 2025-03-13 | End: 2025-05-12

## 2025-03-20 DIAGNOSIS — F41.9 ANXIETY AND DEPRESSION: ICD-10-CM

## 2025-03-20 DIAGNOSIS — F32.A ANXIETY AND DEPRESSION: ICD-10-CM

## 2025-03-20 RX ORDER — SERTRALINE HYDROCHLORIDE 50 MG/1
50 TABLET, FILM COATED ORAL DAILY
Qty: 90 TABLET | Refills: 3 | Status: SHIPPED | OUTPATIENT
Start: 2025-03-20 | End: 2026-03-20

## 2025-04-04 ENCOUNTER — TELEPHONE (OUTPATIENT)
Dept: PRIMARY CARE | Facility: CLINIC | Age: 35
End: 2025-04-04
Payer: COMMERCIAL

## 2025-04-04 NOTE — TELEPHONE ENCOUNTER
Susu looking for last progress notes to show the benefit of pt using Deep 2 sensors glucose monitor, notes from November do not mention the benefit received. Please fax notes to [phone] or call [phone]

## 2025-05-02 DIAGNOSIS — H40.53X1 NEOVASCULAR GLAUCOMA OF BOTH EYES, MILD STAGE: ICD-10-CM

## 2025-05-02 DIAGNOSIS — E10.3519: ICD-10-CM

## 2025-05-02 DIAGNOSIS — E10.9 TYPE 1 DIABETES MELLITUS WITHOUT COMPLICATIONS: ICD-10-CM

## 2025-05-02 DIAGNOSIS — E08.311 ADVANCED DIABETIC MACULOPATHY WITH RETINOPATHY AND MACULAR EDEMA ASSOCIATED WITH DIABETES MELLITUS DUE TO UNDERLYING CONDITION: ICD-10-CM

## 2025-05-02 DIAGNOSIS — H40.51X4 GLAUCOMA SECONDARY TO OTHER EYE DISORDERS, RIGHT EYE, INDETERMINATE STAGE: ICD-10-CM

## 2025-05-02 DIAGNOSIS — E10.3499: ICD-10-CM

## 2025-05-03 RX ORDER — ATROPINE SULFATE 10 MG/ML
1 SOLUTION/ DROPS OPHTHALMIC 2 TIMES DAILY
Qty: 10 ML | Refills: 11 | Status: SHIPPED | OUTPATIENT
Start: 2025-05-03

## 2025-05-03 RX ORDER — HUMAN INSULIN 100 [IU]/ML
12 INJECTION, SOLUTION SUBCUTANEOUS
Qty: 350 ML | Refills: 3 | Status: SHIPPED | OUTPATIENT
Start: 2025-05-03

## 2025-05-03 RX ORDER — DORZOLAMIDE HYDROCHLORIDE AND TIMOLOL MALEATE 20; 5 MG/ML; MG/ML
1 SOLUTION/ DROPS OPHTHALMIC 2 TIMES DAILY
Qty: 50 ML | Refills: 1 | Status: SHIPPED | OUTPATIENT
Start: 2025-05-03 | End: 2025-07-02

## 2025-05-03 RX ORDER — SYRINGE-NEEDLE,INSULIN,0.5 ML 27GX1/2"
SYRINGE, EMPTY DISPOSABLE MISCELLANEOUS
Qty: 400 EACH | Refills: 3 | Status: SHIPPED | OUTPATIENT
Start: 2025-05-03 | End: 2026-05-02

## 2025-05-03 RX ORDER — PREDNISOLONE ACETATE 10 MG/ML
1 SUSPENSION/ DROPS OPHTHALMIC 2 TIMES DAILY
Qty: 5 ML | Refills: 11 | Status: SHIPPED | OUTPATIENT
Start: 2025-05-03 | End: 2025-06-02

## 2025-05-08 DIAGNOSIS — I10 ESSENTIAL (PRIMARY) HYPERTENSION: ICD-10-CM

## 2025-05-09 RX ORDER — HYDROCHLOROTHIAZIDE 12.5 MG/1
12.5 TABLET ORAL DAILY
Qty: 90 TABLET | Refills: 0 | Status: SHIPPED | OUTPATIENT
Start: 2025-05-09

## 2025-05-15 DIAGNOSIS — I10 HYPERTENSION, UNSPECIFIED TYPE: ICD-10-CM

## 2025-05-16 RX ORDER — METOPROLOL SUCCINATE 100 MG/1
100 TABLET, EXTENDED RELEASE ORAL DAILY
Qty: 90 TABLET | Refills: 0 | Status: SHIPPED | OUTPATIENT
Start: 2025-05-16

## 2025-05-22 DIAGNOSIS — E10.9 TYPE 1 DIABETES MELLITUS WITHOUT COMPLICATIONS: ICD-10-CM

## 2025-05-22 RX ORDER — HUMAN INSULIN 100 [IU]/ML
INJECTION, SOLUTION SUBCUTANEOUS
Qty: 900 ML | Refills: 3 | Status: SHIPPED | OUTPATIENT
Start: 2025-05-22

## 2025-05-22 RX ORDER — HUMAN INSULIN 100 [IU]/ML
INJECTION, SUSPENSION SUBCUTANEOUS
Qty: 720 ML | Refills: 3 | Status: SHIPPED | OUTPATIENT
Start: 2025-05-22

## 2025-08-05 ENCOUNTER — APPOINTMENT (OUTPATIENT)
Dept: PRIMARY CARE | Facility: CLINIC | Age: 35
End: 2025-08-05
Payer: COMMERCIAL

## 2025-08-05 VITALS
HEART RATE: 99 BPM | WEIGHT: 303 LBS | SYSTOLIC BLOOD PRESSURE: 120 MMHG | HEIGHT: 70 IN | TEMPERATURE: 97 F | BODY MASS INDEX: 43.38 KG/M2 | DIASTOLIC BLOOD PRESSURE: 80 MMHG | OXYGEN SATURATION: 95 %

## 2025-08-05 DIAGNOSIS — N18.4 STAGE 4 CHRONIC KIDNEY DISEASE (MULTI): ICD-10-CM

## 2025-08-05 DIAGNOSIS — Z79.4 TYPE 2 DIABETES MELLITUS WITH OTHER OPHTHALMIC COMPLICATION, WITH LONG-TERM CURRENT USE OF INSULIN: ICD-10-CM

## 2025-08-05 DIAGNOSIS — E10.3591 TYPE 1 DIABETES MELLITUS WITH PROLIFERATIVE RETINOPATHY OF RIGHT EYE WITHOUT MACULAR EDEMA: ICD-10-CM

## 2025-08-05 DIAGNOSIS — E10.3519: ICD-10-CM

## 2025-08-05 DIAGNOSIS — E08.311 ADVANCED DIABETIC MACULOPATHY WITH RETINOPATHY AND MACULAR EDEMA ASSOCIATED WITH DIABETES MELLITUS DUE TO UNDERLYING CONDITION: ICD-10-CM

## 2025-08-05 DIAGNOSIS — E10.3541: ICD-10-CM

## 2025-08-05 DIAGNOSIS — E11.39 TYPE 2 DIABETES MELLITUS WITH OTHER OPHTHALMIC COMPLICATION, WITH LONG-TERM CURRENT USE OF INSULIN: ICD-10-CM

## 2025-08-05 DIAGNOSIS — E11.319: ICD-10-CM

## 2025-08-05 DIAGNOSIS — E10.3543: ICD-10-CM

## 2025-08-05 DIAGNOSIS — E10.3413 TYPE 1 DIABETES MELLITUS WITH SEVERE NONPROLIFERATIVE RETINOPATHY OF BOTH EYES AND MACULAR EDEMA: ICD-10-CM

## 2025-08-05 DIAGNOSIS — Z00.00 MEDICARE ANNUAL WELLNESS VISIT, SUBSEQUENT: Primary | ICD-10-CM

## 2025-08-05 DIAGNOSIS — E10.39 TYPE 1 DIABETES MELLITUS WITH OTHER OPHTHALMIC COMPLICATION: ICD-10-CM

## 2025-08-05 DIAGNOSIS — E66.813 CLASS 3 SEVERE OBESITY WITH SERIOUS COMORBIDITY AND BODY MASS INDEX (BMI) OF 40.0 TO 44.9 IN ADULT: ICD-10-CM

## 2025-08-05 PROCEDURE — 1036F TOBACCO NON-USER: CPT | Performed by: FAMILY MEDICINE

## 2025-08-05 PROCEDURE — G0439 PPPS, SUBSEQ VISIT: HCPCS | Performed by: FAMILY MEDICINE

## 2025-08-05 PROCEDURE — 3078F DIAST BP <80 MM HG: CPT | Performed by: FAMILY MEDICINE

## 2025-08-05 PROCEDURE — 3008F BODY MASS INDEX DOCD: CPT | Performed by: FAMILY MEDICINE

## 2025-08-05 PROCEDURE — 3074F SYST BP LT 130 MM HG: CPT | Performed by: FAMILY MEDICINE

## 2025-08-05 RX ORDER — FLASH GLUCOSE SENSOR
KIT MISCELLANEOUS
Qty: 1 EACH | Refills: 0 | Status: CANCELLED | OUTPATIENT
Start: 2025-08-05

## 2025-08-05 RX ORDER — BLOOD-GLUCOSE SENSOR
EACH MISCELLANEOUS
Qty: 6 EACH | Refills: 11 | Status: SHIPPED | OUTPATIENT
Start: 2025-08-05

## 2025-08-07 DIAGNOSIS — I10 ESSENTIAL (PRIMARY) HYPERTENSION: ICD-10-CM

## 2025-08-07 RX ORDER — HYDROCHLOROTHIAZIDE 12.5 MG/1
12.5 TABLET ORAL DAILY
Qty: 90 TABLET | Refills: 0 | Status: SHIPPED | OUTPATIENT
Start: 2025-08-07

## 2025-08-13 DIAGNOSIS — I10 HYPERTENSION, UNSPECIFIED TYPE: ICD-10-CM

## 2025-08-13 RX ORDER — METOPROLOL SUCCINATE 100 MG/1
100 TABLET, EXTENDED RELEASE ORAL DAILY
Qty: 90 TABLET | Refills: 0 | Status: SHIPPED | OUTPATIENT
Start: 2025-08-13

## 2025-08-16 PROBLEM — E10.3541: Status: ACTIVE | Noted: 2025-08-16

## 2025-08-16 PROBLEM — E10.9 TYPE 1 DIABETES MELLITUS: Status: ACTIVE | Noted: 2023-11-22

## 2025-08-16 PROBLEM — N18.30 STAGE 3 CHRONIC KIDNEY DISEASE (MULTI): Status: RESOLVED | Noted: 2024-11-11 | Resolved: 2025-08-16

## 2025-08-16 PROBLEM — Z00.00 MEDICARE ANNUAL WELLNESS VISIT, SUBSEQUENT: Status: ACTIVE | Noted: 2025-08-16

## 2025-08-16 PROBLEM — H57.89 SWELLING OF STRUCTURE OF EYE: Status: ACTIVE | Noted: 2025-08-16

## 2025-08-16 PROBLEM — E10.3543: Status: ACTIVE | Noted: 2025-08-16

## 2025-08-16 PROBLEM — N18.4 STAGE 4 CHRONIC KIDNEY DISEASE (MULTI): Status: ACTIVE | Noted: 2025-08-16

## 2025-08-16 PROBLEM — N28.9 RENAL DYSFUNCTION: Status: RESOLVED | Noted: 2023-12-19 | Resolved: 2025-08-16

## 2025-08-16 PROBLEM — H40.50X0: Status: ACTIVE | Noted: 2025-08-16

## 2025-08-16 ASSESSMENT — ACTIVITIES OF DAILY LIVING (ADL)
DRESSING: INDEPENDENT
BATHING: INDEPENDENT
TAKING_MEDICATION: NEEDS ASSISTANCE
MANAGING_FINANCES: NEEDS ASSISTANCE
DOING_HOUSEWORK: NEEDS ASSISTANCE
MANAGING_FINANCES: NEEDS ASSISTANCE
GROCERY_SHOPPING: NEEDS ASSISTANCE
GROCERY_SHOPPING: NEEDS ASSISTANCE
DOING_HOUSEWORK: NEEDS ASSISTANCE
TAKING_MEDICATION: NEEDS ASSISTANCE
BATHING: INDEPENDENT
DRESSING: INDEPENDENT

## 2025-08-16 ASSESSMENT — PATIENT HEALTH QUESTIONNAIRE - PHQ9
2. FEELING DOWN, DEPRESSED OR HOPELESS: NOT AT ALL
1. LITTLE INTEREST OR PLEASURE IN DOING THINGS: NOT AT ALL
1. LITTLE INTEREST OR PLEASURE IN DOING THINGS: NOT AT ALL
SUM OF ALL RESPONSES TO PHQ9 QUESTIONS 1 AND 2: 0
2. FEELING DOWN, DEPRESSED OR HOPELESS: NOT AT ALL
SUM OF ALL RESPONSES TO PHQ9 QUESTIONS 1 AND 2: 0

## 2025-08-20 DIAGNOSIS — I10 HYPERTENSION, UNSPECIFIED TYPE: ICD-10-CM

## 2025-08-20 DIAGNOSIS — I10 PRIMARY HYPERTENSION: Primary | ICD-10-CM

## 2025-08-20 RX ORDER — HYDRALAZINE HYDROCHLORIDE 50 MG/1
50 TABLET, FILM COATED ORAL 2 TIMES DAILY
Qty: 180 TABLET | Refills: 3 | Status: SHIPPED | OUTPATIENT
Start: 2025-08-20 | End: 2026-08-20

## 2026-01-06 ENCOUNTER — APPOINTMENT (OUTPATIENT)
Dept: PRIMARY CARE | Facility: CLINIC | Age: 36
End: 2026-01-06
Payer: COMMERCIAL